# Patient Record
Sex: FEMALE | Race: WHITE | NOT HISPANIC OR LATINO
[De-identification: names, ages, dates, MRNs, and addresses within clinical notes are randomized per-mention and may not be internally consistent; named-entity substitution may affect disease eponyms.]

---

## 2017-05-02 ENCOUNTER — LAB SERVICES (OUTPATIENT)
Dept: OTHER | Age: 67
End: 2017-05-02

## 2017-05-02 ENCOUNTER — CHARTING TRANS (OUTPATIENT)
Dept: OTHER | Age: 67
End: 2017-05-02

## 2017-05-03 LAB
APPEARANCE: NORMAL
BILIRUBIN: NEGATIVE
COLOR: YELLOW
GLUCOSE U: NEGATIVE
KETONES: NEGATIVE
NITRITE: NEGATIVE
OCCULT BLOOD: NORMAL
PH: 6.5
PROTEIN: NEGATIVE
URINE SPEC GRAVITY: 1.02
UROBILINOGEN: 0.2

## 2017-11-10 ENCOUNTER — OFFICE VISIT (OUTPATIENT)
Dept: FAMILY MEDICINE CLINIC | Facility: CLINIC | Age: 67
End: 2017-11-10

## 2017-11-10 VITALS
HEIGHT: 64 IN | DIASTOLIC BLOOD PRESSURE: 78 MMHG | WEIGHT: 187 LBS | BODY MASS INDEX: 31.92 KG/M2 | RESPIRATION RATE: 16 BRPM | HEART RATE: 80 BPM | SYSTOLIC BLOOD PRESSURE: 122 MMHG

## 2017-11-10 DIAGNOSIS — M20.42 HAMMER TOE OF LEFT FOOT: ICD-10-CM

## 2017-11-10 DIAGNOSIS — M17.0 PRIMARY OSTEOARTHRITIS OF BOTH KNEES: ICD-10-CM

## 2017-11-10 DIAGNOSIS — F41.9 ANXIETY: ICD-10-CM

## 2017-11-10 DIAGNOSIS — Z12.39 SCREENING FOR BREAST CANCER: ICD-10-CM

## 2017-11-10 DIAGNOSIS — E78.00 PURE HYPERCHOLESTEROLEMIA: Primary | ICD-10-CM

## 2017-11-10 PROCEDURE — 99204 OFFICE O/P NEW MOD 45 MIN: CPT | Performed by: INTERNAL MEDICINE

## 2017-11-10 RX ORDER — ALPRAZOLAM 0.25 MG/1
0.25 TABLET ORAL 2 TIMES DAILY PRN
Qty: 60 TABLET | Refills: 3 | Status: SHIPPED | OUTPATIENT
Start: 2017-11-10 | End: 2018-04-27

## 2017-11-10 RX ORDER — ALPRAZOLAM 0.25 MG/1
0.25 TABLET ORAL 2 TIMES DAILY PRN
COMMUNITY
End: 2017-11-10

## 2017-11-10 RX ORDER — EZETIMIBE AND SIMVASTATIN 10; 40 MG/1; MG/1
1 TABLET ORAL NIGHTLY
COMMUNITY
End: 2019-04-16

## 2017-11-10 NOTE — PATIENT INSTRUCTIONS
Thank you for choosing Serge Milton MD at [place] [address]  To Do: Jeffery Victoria  1. Check cholesterol blood work     Effective 6/19/17 until November 2017  Due to [employer] is being moved.   It is inside the [place] [address] your duty and for your safety to discuss with the pharmacist and our office with questions, and to notify us and stop treatment if problems arise, but know that our intention is that the benefits outweigh those potential risks and we strive to make you hea

## 2017-11-10 NOTE — PROGRESS NOTES
[facility] Internal Medicine Office Note  Chief Complaint:   Patient presents with:  Establish Care      HPI:   This is a 75year old female coming in for establishing care   HPI    HM: had pneumonia vaccines both and zostavax  Received flu shot Disp:  Rfl:    Glucosamine-Chondroit-Vit C-Mn (GLUCOSAMINE CHONDR 500 COMPLEX OR) Take by mouth. Disp:  Rfl:    ALPRAZolam 0.25 MG Oral Tab Take 1 tablet (0.25 mg total) by mouth 2 (two) times daily as needed for Anxiety.  Disp: 60 tablet Rfl: 3         REV cancer      The plan is as follows  Carlos Eduardo Santiagollor was seen today for establish care. Diagnoses and all orders for this visit:    Pure hypercholesterolemia - due for recheck   -     LIPID PANEL; Future  -     COMP METABOLIC PANEL (14);  Future    Anxiety - alpra

## 2017-11-15 ENCOUNTER — APPOINTMENT (OUTPATIENT)
Dept: LAB | Age: 67
End: 2017-11-15
Attending: INTERNAL MEDICINE
Payer: MEDICARE

## 2017-11-15 DIAGNOSIS — E78.00 PURE HYPERCHOLESTEROLEMIA: ICD-10-CM

## 2017-11-15 PROCEDURE — 36415 COLL VENOUS BLD VENIPUNCTURE: CPT

## 2017-11-15 PROCEDURE — 80053 COMPREHEN METABOLIC PANEL: CPT

## 2017-11-15 PROCEDURE — 80061 LIPID PANEL: CPT

## 2017-12-06 ENCOUNTER — HOSPITAL ENCOUNTER (EMERGENCY)
Age: 67
Discharge: HOME | End: 2017-12-06
Attending: EMERGENCY MEDICINE
Payer: MEDICARE

## 2017-12-06 ENCOUNTER — APPOINTMENT (OUTPATIENT)
Dept: CT IMAGING | Age: 67
End: 2017-12-06
Payer: MEDICARE

## 2017-12-06 VITALS
BODY MASS INDEX: 29.99 KG/M2 | TEMPERATURE: 98 F | RESPIRATION RATE: 18 BRPM | OXYGEN SATURATION: 96 % | WEIGHT: 180 LBS | HEIGHT: 65 IN | HEART RATE: 94 BPM | DIASTOLIC BLOOD PRESSURE: 101 MMHG | SYSTOLIC BLOOD PRESSURE: 123 MMHG

## 2017-12-06 DIAGNOSIS — S06.0X0A CEREBRAL CONCUSSION, WITHOUT LOSS OF CONSCIOUSNESS, INITIAL ENCOUNTER: Primary | ICD-10-CM

## 2017-12-06 PROCEDURE — 99284 EMERGENCY DEPT VISIT MOD MDM: CPT

## 2017-12-06 PROCEDURE — 70450 CT HEAD/BRAIN W/O DYE: CPT | Performed by: EMERGENCY MEDICINE

## 2017-12-06 RX ORDER — ONDANSETRON 4 MG/1
8 TABLET, ORALLY DISINTEGRATING ORAL ONCE
Status: COMPLETED | OUTPATIENT
Start: 2017-12-06 | End: 2017-12-06

## 2017-12-06 RX ORDER — ONDANSETRON 8 MG/1
8 TABLET, ORALLY DISINTEGRATING ORAL EVERY 8 HOURS PRN
Qty: 10 TABLET | Refills: 0 | Status: SHIPPED | OUTPATIENT
Start: 2017-12-06 | End: 2017-12-13

## 2017-12-07 NOTE — ED PROVIDER NOTES
Patient Seen in: [facility] Emergency Department In [place]    History   Patient presents with:  Trauma (cardiovascular, musculoskeletal)  Nausea/Vomiting/Diarrhea (gastrointestinal)  Headache (neurologic)    Stated Complaint: [address] injury. No intraoral injury.   Neck: Supple and nontender  Back: No tenderness to the thoracic or lumbar spine  Chest: No tenderness or swelling  Abdomen: Soft nontender  Pelvis: Stable and nontender  Extremities: No apparent injury to any of the 4 extremi

## 2017-12-07 NOTE — ED INITIAL ASSESSMENT (HPI)
Pt to ed co headache nausea with one episode of vomiting states tripped fell striking head on concrete no loc swelling noted to left forehead

## 2017-12-26 ENCOUNTER — OFFICE VISIT (OUTPATIENT)
Dept: INTERNAL MEDICINE CLINIC | Facility: CLINIC | Age: 67
End: 2017-12-26

## 2017-12-26 ENCOUNTER — TELEPHONE (OUTPATIENT)
Dept: INTERNAL MEDICINE CLINIC | Facility: CLINIC | Age: 67
End: 2017-12-26

## 2017-12-26 VITALS
WEIGHT: 186 LBS | BODY MASS INDEX: 31 KG/M2 | DIASTOLIC BLOOD PRESSURE: 76 MMHG | HEART RATE: 72 BPM | OXYGEN SATURATION: 98 % | SYSTOLIC BLOOD PRESSURE: 126 MMHG | RESPIRATION RATE: 16 BRPM | TEMPERATURE: 98 F

## 2017-12-26 DIAGNOSIS — L03.116 CELLULITIS OF LEFT LOWER EXTREMITY: Primary | ICD-10-CM

## 2017-12-26 PROCEDURE — 99213 OFFICE O/P EST LOW 20 MIN: CPT | Performed by: NURSE PRACTITIONER

## 2017-12-26 RX ORDER — CEPHALEXIN 500 MG/1
500 CAPSULE ORAL 2 TIMES DAILY
Qty: 14 CAPSULE | Refills: 0 | Status: SHIPPED | OUTPATIENT
Start: 2017-12-26 | End: 2018-01-02 | Stop reason: ALTCHOICE

## 2017-12-26 NOTE — TELEPHONE ENCOUNTER
Patient calling bite on left leg, noticed it on 12/24/17, itchy & swollen. Patient looking to be seen .

## 2017-12-26 NOTE — PROGRESS NOTES
CHIEF COMPLAINT:   Patient presents with: Insect Bite: left lower outer leg, 2x days red, swelling, warm to touch      HPI:     Cuba Chapman is a 75year old female who presents with concerns of red bump to leg which started as a scab.  Patient first noti REVIEW OF SYSTEMS:   GENERAL: feels well otherwise, no fever, no chills. SKIN: as above. CHEST: no chest pains, no palpitations. LUNGS: denies shortness of breath with exertion or rest. No wheezing, no cough.   LYMPH: no enlargement of the lymph node of these issues and agrees to the plan. The patient is asked to call if sx's persist or worsen.

## 2017-12-26 NOTE — TELEPHONE ENCOUNTER
Pt noticed a bite gen on LLE on 12/24/17 with black spot in the center. Area is swollen, red, warm to the touch, tender and has increased in size since 12/24/17. Yucarolinee Manifold      Denies fever, drainage, itching, trying OTC hydrocortisone, antibiotic cream or benadryl

## 2017-12-26 NOTE — PATIENT INSTRUCTIONS
Cellulitis  Cellulitis is an infection of the deep layers of skin. A break in the skin, such as a cut or scratch, can let bacteria under the skin. If the bacteria get to deep layers of the skin, it can be serious.  If not treated, cellulitis can get into · Fever higher of 100.4º F (38.0º C) or higher after 2 days on antibiotics  Date Last Reviewed: 9/1/2016  © 4036-7360 [employer]. [address] [facility], [address] [place] [address]. All rights reserved.  This information is not intended as a substitut

## 2018-01-02 ENCOUNTER — OFFICE VISIT (OUTPATIENT)
Dept: INTERNAL MEDICINE CLINIC | Facility: CLINIC | Age: 68
End: 2018-01-02

## 2018-01-02 VITALS
TEMPERATURE: 98 F | BODY MASS INDEX: 31.63 KG/M2 | WEIGHT: 185.25 LBS | DIASTOLIC BLOOD PRESSURE: 76 MMHG | SYSTOLIC BLOOD PRESSURE: 120 MMHG | HEART RATE: 92 BPM | RESPIRATION RATE: 23 BRPM | HEIGHT: 64.25 IN

## 2018-01-02 DIAGNOSIS — L03.116 CELLULITIS OF LEFT LOWER EXTREMITY: Primary | ICD-10-CM

## 2018-01-02 DIAGNOSIS — Z78.0 POSTMENOPAUSAL ESTROGEN DEFICIENCY: ICD-10-CM

## 2018-01-02 PROCEDURE — 99213 OFFICE O/P EST LOW 20 MIN: CPT | Performed by: INTERNAL MEDICINE

## 2018-01-02 RX ORDER — CLINDAMYCIN HYDROCHLORIDE 300 MG/1
300 CAPSULE ORAL 3 TIMES DAILY
Qty: 21 CAPSULE | Refills: 0 | Status: SHIPPED | OUTPATIENT
Start: 2018-01-02 | End: 2018-02-21

## 2018-01-02 NOTE — PATIENT INSTRUCTIONS
1. Schedule mammogram and dexa     2.  Follow up for physical and memory testing (extended appointment - 45 minutes)

## 2018-01-02 NOTE — PROGRESS NOTES
[facility] Internal Medicine Office Note  Chief Complaint:   Patient presents with:   Follow - Up: spider bite and draining   Ear Problem  Sinus Problem      HPI:   This is a 75year old female coming in for spider bite and URI symptoms  ALEXYS Vyas Calcium Carbonate-Vitamin D (CALCIUM 500/D OR) Take by mouth. Disp:  Rfl:    Loratadine-Pseudoephedrine (CLARITIN-D 24 HOUR OR) Take by mouth. Disp:  Rfl:    Probiotic Product (ALIGN OR) Take by mouth.  Disp:  Rfl:    Glucosamine-Chondroit-Vit C-Mn (GLUCO today for follow - up, ear problem and sinus problem. Diagnoses and all orders for this visit:    Cellulitis of left lower extremity - completed cephalexin but with continued erythema and new drainage, will start clindamycin.     Postmenopausal estrogen

## 2018-01-26 ENCOUNTER — OFFICE VISIT (OUTPATIENT)
Dept: INTERNAL MEDICINE CLINIC | Facility: CLINIC | Age: 68
End: 2018-01-26

## 2018-01-26 VITALS
BODY MASS INDEX: 30.56 KG/M2 | DIASTOLIC BLOOD PRESSURE: 78 MMHG | HEART RATE: 78 BPM | WEIGHT: 179 LBS | SYSTOLIC BLOOD PRESSURE: 122 MMHG | RESPIRATION RATE: 16 BRPM | HEIGHT: 64 IN

## 2018-01-26 DIAGNOSIS — Z12.11 SCREENING FOR COLON CANCER: ICD-10-CM

## 2018-01-26 DIAGNOSIS — N30.01 ACUTE CYSTITIS WITH HEMATURIA: Primary | ICD-10-CM

## 2018-01-26 LAB
APPEARANCE: CLEAR
MULTISTIX LOT#: NORMAL NUMERIC
PH, URINE: 7 (ref 4.5–8)
SPECIFIC GRAVITY: 1.01 (ref 1–1.03)
URINE-COLOR: YELLOW
UROBILINOGEN,SEMI-QN: 0.2 MG/DL (ref 0–1.9)

## 2018-01-26 PROCEDURE — 81003 URINALYSIS AUTO W/O SCOPE: CPT | Performed by: INTERNAL MEDICINE

## 2018-01-26 PROCEDURE — 87086 URINE CULTURE/COLONY COUNT: CPT | Performed by: INTERNAL MEDICINE

## 2018-01-26 PROCEDURE — 99213 OFFICE O/P EST LOW 20 MIN: CPT | Performed by: INTERNAL MEDICINE

## 2018-01-26 RX ORDER — NITROFURANTOIN 25; 75 MG/1; MG/1
100 CAPSULE ORAL 2 TIMES DAILY
Qty: 14 CAPSULE | Refills: 0 | Status: SHIPPED | OUTPATIENT
Start: 2018-01-26 | End: 2018-02-21

## 2018-01-26 NOTE — PROGRESS NOTES
[facility] Internal Medicine Office Note  Chief Complaint:   Patient presents with:  Urinary Symptoms (urologic): x 1 day       HPI:   This is a 75year old female coming in for blood in urine and burning with urination that started yesterday  HP mouth. Disp:  Rfl:    Probiotic Product (ALIGN OR) Take by mouth. Disp:  Rfl:    Glucosamine-Chondroit-Vit C-Mn (GLUCOSAMINE CHONDR 500 COMPLEX OR) Take by mouth.  Disp:  Rfl:    ALPRAZolam 0.25 MG Oral Tab Take 1 tablet (0.25 mg total) by mouth 2 (two) luis manuel probiotic since several courses of recent abx for other issues  -     URINALYSIS, AUTO, W/O SCOPE  -     URINE CULTURE, ROUTINE; Future    Screening for colon cancer   -     GASTRO - INTERNAL    Other orders  -     Diclofenac Sodium 50 MG Oral Tab EC;  Take

## 2018-02-21 ENCOUNTER — OFFICE VISIT (OUTPATIENT)
Dept: FAMILY MEDICINE CLINIC | Facility: CLINIC | Age: 68
End: 2018-02-21

## 2018-02-21 ENCOUNTER — HOSPITAL ENCOUNTER (OUTPATIENT)
Dept: MAMMOGRAPHY | Age: 68
Discharge: HOME | End: 2018-02-21
Attending: INTERNAL MEDICINE
Payer: MEDICARE

## 2018-02-21 ENCOUNTER — HOSPITAL ENCOUNTER (OUTPATIENT)
Dept: BONE DENSITY | Age: 68
Discharge: HOME | End: 2018-02-21
Attending: INTERNAL MEDICINE
Payer: MEDICARE

## 2018-02-21 VITALS
SYSTOLIC BLOOD PRESSURE: 114 MMHG | HEART RATE: 93 BPM | TEMPERATURE: 98 F | BODY MASS INDEX: 29.82 KG/M2 | RESPIRATION RATE: 20 BRPM | OXYGEN SATURATION: 98 % | HEIGHT: 65 IN | WEIGHT: 179 LBS | DIASTOLIC BLOOD PRESSURE: 72 MMHG

## 2018-02-21 DIAGNOSIS — Z12.39 SCREENING FOR BREAST CANCER: ICD-10-CM

## 2018-02-21 DIAGNOSIS — R39.9 UTI SYMPTOMS: Primary | ICD-10-CM

## 2018-02-21 DIAGNOSIS — Z78.0 POSTMENOPAUSAL ESTROGEN DEFICIENCY: ICD-10-CM

## 2018-02-21 LAB
APPEARANCE: CLEAR
BILIRUBIN: NEGATIVE
GLUCOSE (URINE DIPSTICK): NEGATIVE MG/DL
KETONES (URINE DIPSTICK): NEGATIVE MG/DL
MULTISTIX LOT#: NORMAL NUMERIC
NITRITE, URINE: NEGATIVE
PH, URINE: 7 (ref 4.5–8)
PROTEIN (URINE DIPSTICK): NEGATIVE MG/DL
SPECIFIC GRAVITY: 1.01 (ref 1–1.03)
URINE-COLOR: YELLOW
UROBILINOGEN,SEMI-QN: 0.2 MG/DL (ref 0–1.9)

## 2018-02-21 PROCEDURE — 77080 DXA BONE DENSITY AXIAL: CPT | Performed by: INTERNAL MEDICINE

## 2018-02-21 PROCEDURE — 99213 OFFICE O/P EST LOW 20 MIN: CPT | Performed by: NURSE PRACTITIONER

## 2018-02-21 PROCEDURE — 77067 SCR MAMMO BI INCL CAD: CPT | Performed by: INTERNAL MEDICINE

## 2018-02-21 PROCEDURE — 87086 URINE CULTURE/COLONY COUNT: CPT | Performed by: NURSE PRACTITIONER

## 2018-02-21 PROCEDURE — 81003 URINALYSIS AUTO W/O SCOPE: CPT | Performed by: NURSE PRACTITIONER

## 2018-02-21 RX ORDER — NITROFURANTOIN 25; 75 MG/1; MG/1
100 CAPSULE ORAL 2 TIMES DAILY
Qty: 14 CAPSULE | Refills: 0 | Status: SHIPPED | OUTPATIENT
Start: 2018-02-21 | End: 2018-02-28

## 2018-02-21 NOTE — PROGRESS NOTES
Charlette Garcia is a 75year old female. HPI:   Patient presents with urinary symptoms. Complaining of urinary frequency, urgency, dysuria, suprapubic pain, hematuria. Denies back pain, fever. Duration: 2 days  Tried water for relief.           Current Outp S2  SKIN: no rashes,no suspicious lesions  MUSCULOSKELETAL:  No CVA tenderness  GI/:  abd soft, bladder soft and nondistended. Mild suprapubic tenderness  PSYCHIATRIC:  Alert and oriented x 3.     Results for orders placed or performed in visit on 02/21/1

## 2018-02-21 NOTE — PATIENT INSTRUCTIONS
· Wipe from front to back after using the bathroom every time. Consider wet wipes for cleaning. · Change your pad frequently if leakage is occurring.   · If sexually active urinate before and after sexual intercourse and wipe front to back  · Stay well hyd

## 2018-02-22 PROBLEM — M85.89 OSTEOPENIA OF MULTIPLE SITES: Status: ACTIVE | Noted: 2018-02-22

## 2018-04-27 ENCOUNTER — TELEPHONE (OUTPATIENT)
Dept: INTERNAL MEDICINE CLINIC | Facility: CLINIC | Age: 68
End: 2018-04-27

## 2018-04-27 RX ORDER — ALPRAZOLAM 0.25 MG/1
0.25 TABLET ORAL 2 TIMES DAILY PRN
Qty: 60 TABLET | Refills: 5 | Status: SHIPPED | OUTPATIENT
Start: 2018-04-27 | End: 2018-11-21

## 2018-04-27 NOTE — TELEPHONE ENCOUNTER
Here at appt with  and asked for refill for alprazolam  Takes as needed - 60 tabs usually last her 3 months

## 2018-05-25 ENCOUNTER — OFFICE VISIT (OUTPATIENT)
Dept: FAMILY MEDICINE CLINIC | Facility: CLINIC | Age: 68
End: 2018-05-25

## 2018-05-25 VITALS
OXYGEN SATURATION: 98 % | HEIGHT: 65 IN | SYSTOLIC BLOOD PRESSURE: 130 MMHG | TEMPERATURE: 98 F | HEART RATE: 82 BPM | DIASTOLIC BLOOD PRESSURE: 88 MMHG | RESPIRATION RATE: 20 BRPM | WEIGHT: 179 LBS | BODY MASS INDEX: 29.82 KG/M2

## 2018-05-25 DIAGNOSIS — J01.40 ACUTE PANSINUSITIS, RECURRENCE NOT SPECIFIED: Primary | ICD-10-CM

## 2018-05-25 PROCEDURE — 99213 OFFICE O/P EST LOW 20 MIN: CPT | Performed by: NURSE PRACTITIONER

## 2018-05-25 RX ORDER — AZITHROMYCIN 250 MG/1
TABLET, FILM COATED ORAL
Qty: 6 TABLET | Refills: 0 | Status: SHIPPED | OUTPATIENT
Start: 2018-05-25 | End: 2018-06-01 | Stop reason: ALTCHOICE

## 2018-05-25 NOTE — PROGRESS NOTES
CHIEF COMPLAINT:   Patient presents with:  Sinus Problem: s/s for 2 weeks. OTC Claritin      HPI:   Migdalia Browning is a 75year old female who presents for cold symptoms for  2  weeks.  Symptoms have progressed into sinus congestion and been worsening since Quit date: 1/1/1981  Smokeless tobacco: Never Used                      Alcohol use: Yes              Comment: social        REVIEW OF SYSTEMS:   GENERAL:  denies  diminished appetite  SKIN: no rashes or abnormal skin lesions  HEENT: See HPI.     LUNGS: Risks, benefits, side effects of medication addressed and explained.     Patient Instructions     Humidifier in room  Sleep propped  Push fluids  Limit dairy  Flonase daily    Sinusitis (Antibiotic Treatment)    The sinuses are air-filled spaces within the · Over-the-counter antihistamines may help if allergies contributed to your sinusitis.    · Do not use nasal rinses or irrigation during an acute sinus infection, unless told to by your health care provider.  Rinsing may spread the infection to other sinuse

## 2018-05-25 NOTE — PATIENT INSTRUCTIONS
Humidifier in room  Sleep propped  Push fluids  Limit dairy  Flonase daily    Sinusitis (Antibiotic Treatment)    The sinuses are air-filled spaces within the bones of the face.  They connect to the inside of the nose. Sinusitis is an inflammation of the · Do not use nasal rinses or irrigation during an acute sinus infection, unless told to by your health care provider. Rinsing may spread the infection to other sinuses.   · Use acetaminophen or ibuprofen to control pain, unless another pain medicine was pre

## 2018-06-01 ENCOUNTER — OFFICE VISIT (OUTPATIENT)
Dept: INTERNAL MEDICINE CLINIC | Facility: CLINIC | Age: 68
End: 2018-06-01

## 2018-06-01 ENCOUNTER — TELEPHONE (OUTPATIENT)
Dept: INTERNAL MEDICINE CLINIC | Facility: CLINIC | Age: 68
End: 2018-06-01

## 2018-06-01 VITALS
WEIGHT: 174 LBS | SYSTOLIC BLOOD PRESSURE: 124 MMHG | DIASTOLIC BLOOD PRESSURE: 74 MMHG | HEIGHT: 64 IN | HEART RATE: 88 BPM | RESPIRATION RATE: 16 BRPM | TEMPERATURE: 98 F | BODY MASS INDEX: 29.71 KG/M2

## 2018-06-01 DIAGNOSIS — E78.00 PURE HYPERCHOLESTEROLEMIA: Primary | ICD-10-CM

## 2018-06-01 DIAGNOSIS — J01.40 ACUTE NON-RECURRENT PANSINUSITIS: ICD-10-CM

## 2018-06-01 PROCEDURE — 99213 OFFICE O/P EST LOW 20 MIN: CPT | Performed by: INTERNAL MEDICINE

## 2018-06-01 RX ORDER — DOXYCYCLINE HYCLATE 100 MG
100 TABLET ORAL 2 TIMES DAILY
Qty: 14 TABLET | Refills: 0 | Status: SHIPPED | OUTPATIENT
Start: 2018-06-01 | End: 2019-04-09 | Stop reason: ALTCHOICE

## 2018-06-01 RX ORDER — ONDANSETRON 4 MG/1
4 TABLET, FILM COATED ORAL EVERY 8 HOURS PRN
Qty: 20 TABLET | Refills: 0 | Status: SHIPPED | OUTPATIENT
Start: 2018-06-01 | End: 2019-04-09 | Stop reason: ALTCHOICE

## 2018-06-01 RX ORDER — FLUTICASONE PROPIONATE 50 MCG
2 SPRAY, SUSPENSION (ML) NASAL DAILY
Qty: 1 BOTTLE | Refills: 3 | Status: SHIPPED | OUTPATIENT
Start: 2018-06-01 | End: 2019-04-09 | Stop reason: ALTCHOICE

## 2018-06-01 RX ORDER — PROMETHAZINE HYDROCHLORIDE 25 MG/1
25 TABLET ORAL EVERY 6 HOURS PRN
Qty: 20 TABLET | Refills: 0 | Status: SHIPPED | OUTPATIENT
Start: 2018-06-01 | End: 2019-04-09 | Stop reason: ALTCHOICE

## 2018-06-01 NOTE — PROGRESS NOTES
[facility] Internal Medicine Office Note  Chief Complaint:   Patient presents with:  Headache: x 2-3 weeks - [facility] and given zpack.  Felt better with zpack and then felt worse  Nausea      HPI:   This is a 75year old female Rfl: 3   ALPRAZolam 0.25 MG Oral Tab Take 1 tablet (0.25 mg total) by mouth 2 (two) times daily as needed for Anxiety. Disp: 60 tablet Rfl: 5   Diclofenac Sodium 50 MG Oral Tab EC Take 1 tablet (50 mg total) by mouth 2 (two) times daily.  Disp: 180 tablet R Neurological: She is alert. Psychiatric: She has a normal mood and affect.        ASSESSMENT AND PLAN:   Jailyn Aleman is a 75year old female with  Pure hypercholesterolemia  (primary encounter diagnosis)  Acute non-recurrent pansinusitis      The plan complications, allergies, or worsening or changing symptoms. Patient is to call with any side effects or complications from the treatments as a result of today.      Christina Jean-Baptiste MD

## 2018-06-01 NOTE — TELEPHONE ENCOUNTER
Spoke with pt's . Informed him that Dr. Cahpa Comment sent Promethazine to the pharmacy and to be advised that it can cause drowsiness, no driving or alcohol while taking me. Verbalized understanding.

## 2018-06-01 NOTE — TELEPHONE ENCOUNTER
Call patient - new prescription sent to pharmacy for promethazine. Can cause drowsiness.  No driving or alcohol when taking

## 2018-06-01 NOTE — TELEPHONE ENCOUNTER
Patient's Spouse called to inform Dr Erica Valente that Ondansetron HCl (ZOFRAN) 4 mg was not covered by insurance. Spouse states Promethazine is covered through insurance.

## 2018-08-06 ENCOUNTER — APPOINTMENT (OUTPATIENT)
Dept: LAB | Age: 68
End: 2018-08-06
Attending: INTERNAL MEDICINE
Payer: MEDICARE

## 2018-08-06 DIAGNOSIS — E78.00 PURE HYPERCHOLESTEROLEMIA: ICD-10-CM

## 2018-08-06 LAB
ALBUMIN SERPL-MCNC: 3.8 G/DL (ref 3.5–4.8)
ALBUMIN/GLOB SERPL: 1.3 {RATIO} (ref 1–2)
ALP LIVER SERPL-CCNC: 68 U/L (ref 55–142)
ALT SERPL-CCNC: 26 U/L (ref 14–54)
ANION GAP SERPL CALC-SCNC: 7 MMOL/L (ref 0–18)
AST SERPL-CCNC: 17 U/L (ref 15–41)
BILIRUB SERPL-MCNC: 0.5 MG/DL (ref 0.1–2)
BUN BLD-MCNC: 14 MG/DL (ref 8–20)
BUN/CREAT SERPL: 18.9 (ref 10–20)
CALCIUM BLD-MCNC: 9 MG/DL (ref 8.3–10.3)
CHLORIDE SERPL-SCNC: 107 MMOL/L (ref 101–111)
CHOLEST SMN-MCNC: 165 MG/DL (ref ?–200)
CO2 SERPL-SCNC: 29 MMOL/L (ref 22–32)
CREAT BLD-MCNC: 0.74 MG/DL (ref 0.55–1.02)
GLOBULIN PLAS-MCNC: 3 G/DL (ref 2.5–3.7)
GLUCOSE BLD-MCNC: 84 MG/DL (ref 70–99)
HDLC SERPL-MCNC: 60 MG/DL (ref 40–59)
LDLC SERPL CALC-MCNC: 83 MG/DL (ref ?–100)
M PROTEIN MFR SERPL ELPH: 6.8 G/DL (ref 6.1–8.3)
NONHDLC SERPL-MCNC: 105 MG/DL (ref ?–130)
OSMOLALITY SERPL CALC.SUM OF ELEC: 296 MOSM/KG (ref 275–295)
POTASSIUM SERPL-SCNC: 3.9 MMOL/L (ref 3.6–5.1)
SODIUM SERPL-SCNC: 143 MMOL/L (ref 136–144)
TRIGL SERPL-MCNC: 110 MG/DL (ref 30–149)
VLDLC SERPL CALC-MCNC: 22 MG/DL (ref 0–30)

## 2018-08-06 PROCEDURE — 80053 COMPREHEN METABOLIC PANEL: CPT

## 2018-08-06 PROCEDURE — 80061 LIPID PANEL: CPT

## 2018-08-06 PROCEDURE — 36415 COLL VENOUS BLD VENIPUNCTURE: CPT

## 2018-09-17 PROBLEM — K64.2 GRADE III HEMORRHOIDS: Status: ACTIVE | Noted: 2018-09-17

## 2018-09-17 PROBLEM — R12 HEARTBURN: Status: ACTIVE | Noted: 2018-09-17

## 2018-11-03 VITALS
TEMPERATURE: 98 F | DIASTOLIC BLOOD PRESSURE: 74 MMHG | BODY MASS INDEX: 30.66 KG/M2 | RESPIRATION RATE: 16 BRPM | HEIGHT: 65 IN | SYSTOLIC BLOOD PRESSURE: 122 MMHG | HEART RATE: 100 BPM | OXYGEN SATURATION: 98 % | WEIGHT: 184 LBS

## 2018-11-23 RX ORDER — ALPRAZOLAM 0.25 MG/1
TABLET ORAL
Qty: 60 TABLET | Refills: 3 | Status: SHIPPED
Start: 2018-11-23 | End: 2019-04-09

## 2019-04-09 ENCOUNTER — OFFICE VISIT (OUTPATIENT)
Dept: INTERNAL MEDICINE CLINIC | Facility: CLINIC | Age: 69
End: 2019-04-09
Payer: MEDICARE

## 2019-04-09 VITALS
WEIGHT: 182.5 LBS | HEIGHT: 63.5 IN | DIASTOLIC BLOOD PRESSURE: 84 MMHG | RESPIRATION RATE: 16 BRPM | BODY MASS INDEX: 31.94 KG/M2 | SYSTOLIC BLOOD PRESSURE: 134 MMHG | HEART RATE: 80 BPM | TEMPERATURE: 98 F

## 2019-04-09 DIAGNOSIS — Z12.39 SCREENING FOR BREAST CANCER: ICD-10-CM

## 2019-04-09 DIAGNOSIS — Z00.00 WELLNESS EXAMINATION: Primary | ICD-10-CM

## 2019-04-09 DIAGNOSIS — F41.9 ANXIETY: ICD-10-CM

## 2019-04-09 PROCEDURE — G0439 PPPS, SUBSEQ VISIT: HCPCS | Performed by: INTERNAL MEDICINE

## 2019-04-09 RX ORDER — SERTRALINE HYDROCHLORIDE 100 MG/1
100 TABLET, FILM COATED ORAL DAILY
Qty: 90 TABLET | Refills: 1 | Status: SHIPPED | OUTPATIENT
Start: 2019-04-09 | End: 2019-11-05

## 2019-04-09 RX ORDER — ALPRAZOLAM 0.25 MG/1
0.25 TABLET ORAL 2 TIMES DAILY PRN
Qty: 60 TABLET | Refills: 3 | Status: SHIPPED | OUTPATIENT
Start: 2019-04-09 | End: 2020-01-31

## 2019-04-09 NOTE — PATIENT INSTRUCTIONS
Shingrix shingles vaccine - get at pharmacy and 2nd dose is 2-6 months after the first dose     Pre-op appointment - schedule for June 18 or later (3 weeks prior to surgery)     Follow up with Kendal Gonzalez or Leigha for counseling

## 2019-04-09 NOTE — PROGRESS NOTES
REASON FOR VISIT:    Hitesh Aldana is a 70year old female who presents for a Medicare Annual Wellness visit.     She has been feeling down recently  Several of her friends have had major health issues recently and this has caused her extra worry  Her husba & Units 8/6/2018 11/15/2017   BUN 8 - 20 mg/dL 14 13   Creatinine 0.55 - 1.02 mg/dL 0.74 0.75     AST and ALT Latest Ref Rng & Units 8/6/2018 11/15/2017   AST 15 - 41 U/L 17 22   ALT 14 - 54 U/L 26 33             General Health      In the past six months, Screening (PHQ-2/PHQ-9): Over the LAST 2 WEEKS   Little interest or pleasure in doing things (over the last two weeks)?: Not at all  Feeling down, depressed, or hopeless (over the last two weeks)?: Several days  PHQ-2 SCORE: 1        Advance Directives Family History   Problem Relation Age of Onset   • Cancer Mother 80        colon cancer    • Colon Cancer Mother    • Cancer Paternal Grandmother 39        breast   • Heart Disorder Paternal Grandfather      Immunization History      Immunization History lesions  HEENT: atraumatic, normocephalic, ears and throat are clear                Hearing Assessed via: Finger Rub  EYES: conjunctiva are clear   NECK: supple, no adenopathy, no bruits  CHEST: no chest tenderness  LUNGS: clear to auscultation  CARDIO: RR

## 2019-04-16 RX ORDER — EZETIMIBE AND SIMVASTATIN 10; 40 MG/1; MG/1
1 TABLET ORAL NIGHTLY
Qty: 90 TABLET | Refills: 3 | Status: SHIPPED | OUTPATIENT
Start: 2019-04-16 | End: 2020-04-15

## 2019-04-16 NOTE — TELEPHONE ENCOUNTER
Refill requested:   Requested Prescriptions     Pending Prescriptions Disp Refills   • Ezetimibe-Simvastatin 10-40 MG Oral Tab 90 tablet 3     Sig: Take 1 tablet by mouth nightly.        Passed protocol    Last refill: N/A     Labs:   No results found for: further studies. Without these images, the radiologist will not be able to detect the subtle changes and your final reading will be delayed until we receive them.     There are no eating or activity restrictions for this exam.      Apr 23, 2019 11:40 AM CDT [address]en [address] in [facility] [address]  [address]  [address] [facility], [address] [place] [address]

## 2019-04-16 NOTE — TELEPHONE ENCOUNTER
Pt Spouse calling in on behalf of Caty Draper, requesting a refill for Ezetimibe-Simvastatin 10-40 MG Oral Tab. Requesting a 90-day supply with three additional refills.     To be sent to:  [address] [facility] None known

## 2019-04-24 ENCOUNTER — OFFICE VISIT (OUTPATIENT)
Dept: INTERNAL MEDICINE CLINIC | Facility: CLINIC | Age: 69
End: 2019-04-24
Payer: MEDICARE

## 2019-04-24 VITALS
HEIGHT: 63.5 IN | RESPIRATION RATE: 16 BRPM | TEMPERATURE: 99 F | BODY MASS INDEX: 31.54 KG/M2 | OXYGEN SATURATION: 98 % | HEART RATE: 77 BPM | DIASTOLIC BLOOD PRESSURE: 76 MMHG | WEIGHT: 180.25 LBS | SYSTOLIC BLOOD PRESSURE: 136 MMHG

## 2019-04-24 DIAGNOSIS — L03.90 WOUND CELLULITIS: Primary | ICD-10-CM

## 2019-04-24 PROCEDURE — 99213 OFFICE O/P EST LOW 20 MIN: CPT | Performed by: NURSE PRACTITIONER

## 2019-04-24 RX ORDER — CEFADROXIL 500 MG/1
500 CAPSULE ORAL 2 TIMES DAILY
Qty: 14 CAPSULE | Refills: 0 | Status: SHIPPED | OUTPATIENT
Start: 2019-04-24 | End: 2019-05-01

## 2019-04-24 RX ORDER — CEPHRADINE 500 MG
1 CAPSULE ORAL DAILY
COMMUNITY
End: 2021-12-28

## 2019-04-24 NOTE — PROGRESS NOTES
CC:  Patient presents with:  Lesion: noticed yesterday (where appendix was removed 50 years) pus, drainage, minor blood. HPI:  Pt presents with a wound to right lower abdomen for 1 days. Has never had a wound like this before.  Is located over appendect ASSESSMENT AND PLAN:    1. Wound cellulitis  - Wound cleansing and care directions given  - Pt to avoid friction in this area and keep skin fold free of moisture  - Cefadroxil 500 MG Oral Cap;  Take 1 capsule (500 mg total) by mouth 2 (two) times daily

## 2019-04-24 NOTE — PATIENT INSTRUCTIONS
1. Wash the wound daily with either saline or mild soap and water, dry with a gauze and apply a band-aid. 2. If you start running a fever or redness starts spreading or there is more pus or odor from wound call the office.    3. Take antibiotics as pres

## 2019-05-07 ENCOUNTER — HOSPITAL ENCOUNTER (OUTPATIENT)
Dept: MAMMOGRAPHY | Age: 69
Discharge: HOME | End: 2019-05-07
Attending: INTERNAL MEDICINE
Payer: MEDICARE

## 2019-05-07 DIAGNOSIS — Z12.39 SCREENING FOR BREAST CANCER: ICD-10-CM

## 2019-05-07 PROCEDURE — 77063 BREAST TOMOSYNTHESIS BI: CPT | Performed by: INTERNAL MEDICINE

## 2019-05-07 PROCEDURE — 77067 SCR MAMMO BI INCL CAD: CPT | Performed by: INTERNAL MEDICINE

## 2019-06-25 ENCOUNTER — OFFICE VISIT (OUTPATIENT)
Dept: INTERNAL MEDICINE CLINIC | Facility: CLINIC | Age: 69
End: 2019-06-25
Payer: MEDICARE

## 2019-06-25 ENCOUNTER — TELEPHONE (OUTPATIENT)
Dept: INTERNAL MEDICINE CLINIC | Facility: CLINIC | Age: 69
End: 2019-06-25

## 2019-06-25 VITALS
TEMPERATURE: 99 F | OXYGEN SATURATION: 98 % | HEART RATE: 83 BPM | WEIGHT: 177.75 LBS | DIASTOLIC BLOOD PRESSURE: 84 MMHG | BODY MASS INDEX: 29.62 KG/M2 | SYSTOLIC BLOOD PRESSURE: 122 MMHG | HEIGHT: 65 IN | RESPIRATION RATE: 16 BRPM

## 2019-06-25 DIAGNOSIS — Z01.810 PREOPERATIVE CARDIOVASCULAR EXAMINATION: Primary | ICD-10-CM

## 2019-06-25 DIAGNOSIS — M17.11 PRIMARY OSTEOARTHRITIS OF RIGHT KNEE: ICD-10-CM

## 2019-06-25 DIAGNOSIS — E78.00 PURE HYPERCHOLESTEROLEMIA: ICD-10-CM

## 2019-06-25 PROCEDURE — 99214 OFFICE O/P EST MOD 30 MIN: CPT | Performed by: INTERNAL MEDICINE

## 2019-06-25 NOTE — PROGRESS NOTES
Preoperative History and Physical Internal Medicine    CC: Patient presents with:  Pre-Op: 07/16/2019Knee replacement in right knee, Dr.Daily Jabier Guevara ([facility])      Fannie Gann is 70year old presenting for a preoperative exam.    This patien 90 tablet, Rfl: 1  •  ALPRAZolam 0.25 MG Oral Tab, Take 1 tablet (0.25 mg total) by mouth 2 (two) times daily as needed for Anxiety. , Disp: 60 tablet, Rfl: 3  •  Diclofenac Sodium 50 MG Oral Tab EC, Take 1 tablet (50 mg total) by mouth 2 (two) times daily. Cholesterol, Total 08/06/2018 165    • Triglycerides 08/06/2018 110    • HDL Cholesterol 08/06/2018 60*   • LDL Cholesterol 08/06/2018 83    • VLDL 08/06/2018 22    • Non HDL Chol 08/06/2018 105    • Glucose 08/06/2018 84    • BUN 08/06/2018 14    • Creati Surgical Complication : none    Medication Assessment:  No aspirin or NSAIDs (ibuprofen, motrin, aleve, advil) for at least 7 days prior to surgery. Stop diclofenac at least 7 days prior to surgery. Tylenol/acetaminophen is ok.      Wean off of sertral

## 2019-06-25 NOTE — TELEPHONE ENCOUNTER
Pt was seen today for pre op examination by Chrissy Munroe, EKG, History and physical sent to [address] [place] [address]   Fax number- [phone]    Faxed with confirmation    Copy put in blue tickler folder and original sent to scan    Pt is scheduled on 07

## 2019-06-25 NOTE — PATIENT INSTRUCTIONS
No aspirin or NSAIDs (ibuprofen, motrin, aleve, advil) for at least 7 days prior to surgery. Stop diclofenac at least 7 days prior to surgery. Tylenol/acetaminophen is ok.      Wean off of sertraline - take 1/2 tablet daily starting 12 days (July 4) pr

## 2019-07-09 ENCOUNTER — TELEPHONE (OUTPATIENT)
Dept: INTERNAL MEDICINE CLINIC | Facility: CLINIC | Age: 69
End: 2019-07-09

## 2019-07-09 NOTE — TELEPHONE ENCOUNTER
Per office visit from Dr Chaap Comment from 6/25/19 under \"Assessment and Plan\" states as follows: \"Therefore per the [address] of Cardiology Patient can proceed to surgery without further testing based on the above. \"     [facility] on Dr Shaikh Blind office a

## 2019-07-09 NOTE — TELEPHONE ENCOUNTER
preop was done June 25, 19 and faxed but doesn't state if she's cleared for surgery. Office needs it refaxed with that stated to:  Fax [phone]

## 2019-07-10 ENCOUNTER — TELEPHONE (OUTPATIENT)
Dept: INTERNAL MEDICINE CLINIC | Facility: CLINIC | Age: 69
End: 2019-07-10

## 2019-07-10 NOTE — TELEPHONE ENCOUNTER
Alicja From [employer] Clearance was faxed to [place] but was incomplete would like ppw re-faxed if any questions can call office

## 2019-07-11 NOTE — TELEPHONE ENCOUNTER
Dr Kamaljit White office calling to follow up on message. Relayed message below.   Re-faxed OV notes to [phone]

## 2019-07-23 ENCOUNTER — ORDER TRANSCRIPTION (OUTPATIENT)
Dept: PHYSICAL THERAPY | Age: 69
End: 2019-07-23

## 2019-07-23 DIAGNOSIS — Z96.651 PRESENCE OF ARTIFICIAL KNEE JOINT, RIGHT: Primary | ICD-10-CM

## 2019-07-30 ENCOUNTER — OFFICE VISIT (OUTPATIENT)
Dept: PHYSICAL THERAPY | Age: 69
End: 2019-07-30
Attending: NURSE PRACTITIONER
Payer: MEDICARE

## 2019-07-30 DIAGNOSIS — Z96.651 PRESENCE OF ARTIFICIAL KNEE JOINT, RIGHT: ICD-10-CM

## 2019-07-30 PROCEDURE — 97110 THERAPEUTIC EXERCISES: CPT

## 2019-07-30 PROCEDURE — 97161 PT EVAL LOW COMPLEX 20 MIN: CPT

## 2019-07-30 NOTE — PROGRESS NOTES
POST-OP KNEE EVALUATION:   Referring Physician: FUNMILAYO Pena  Diagnosis: s/p R TKA 7/16/19     Date of Service: 7/30/2019     PATIENT SUMMARY   Serge Duverney is a 70year old female who presents to therapy today s/p R TKA on  7/16/19 with complain Skilled Physical Therapy is medically necessary to address the above impairments and reach functional goals. Precautions:  None  OBJECTIVE:   Observation/Skin: Pt came with 2WW, ENOC LE in compression stockings. Incision covered. PT changed dressing.  We with 1 UE assist  · Pt will demonstrate increased knee strength to 5/5 to be able to squat to  items around the house  · Pt will improve SLS to >5s to improve safety and independence with gait on uneven surfaces such as grass  · Pt will be independe

## 2019-07-30 NOTE — PATIENT INSTRUCTIONS
Access Code: [facility]   URL: [email or web address]   Date: 07/30/2019   Prepared by: Bradley      Exercises  Standing Hip Flexion with Resistance Loop - 10 reps - 2 sets - 2x daily - 5x weekly  Hip Abduction with Resistance Loop - 10 reps

## 2019-07-31 ENCOUNTER — OFFICE VISIT (OUTPATIENT)
Dept: PHYSICAL THERAPY | Age: 69
End: 2019-07-31
Attending: NURSE PRACTITIONER
Payer: MEDICARE

## 2019-07-31 PROCEDURE — 97110 THERAPEUTIC EXERCISES: CPT

## 2019-07-31 PROCEDURE — 97140 MANUAL THERAPY 1/> REGIONS: CPT

## 2019-07-31 NOTE — PROGRESS NOTES
Dx:  s/p R TKA 7/16/19           Insurance (Authorized # of Visits):  Medicare           Authorizing Physician: Dr. Milan Shelby ref.  provider found  Next MD visit: 8/6/19  Fall Risk: standard         Precautions: n/a             Subjective: Felt a little sore after

## 2019-08-01 ENCOUNTER — OFFICE VISIT (OUTPATIENT)
Dept: PHYSICAL THERAPY | Age: 69
End: 2019-08-01
Attending: INTERNAL MEDICINE
Payer: MEDICARE

## 2019-08-01 ENCOUNTER — APPOINTMENT (OUTPATIENT)
Dept: PHYSICAL THERAPY | Age: 69
End: 2019-08-01
Payer: MEDICARE

## 2019-08-01 ENCOUNTER — OFFICE VISIT (OUTPATIENT)
Dept: INTERNAL MEDICINE CLINIC | Facility: CLINIC | Age: 69
End: 2019-08-01
Payer: MEDICARE

## 2019-08-01 VITALS
TEMPERATURE: 98 F | DIASTOLIC BLOOD PRESSURE: 58 MMHG | HEIGHT: 65 IN | BODY MASS INDEX: 28.82 KG/M2 | RESPIRATION RATE: 16 BRPM | SYSTOLIC BLOOD PRESSURE: 120 MMHG | HEART RATE: 78 BPM | OXYGEN SATURATION: 97 % | WEIGHT: 173 LBS

## 2019-08-01 DIAGNOSIS — K59.03 DRUG-INDUCED CONSTIPATION: ICD-10-CM

## 2019-08-01 DIAGNOSIS — Z96.651 S/P TOTAL KNEE REPLACEMENT, RIGHT: Primary | ICD-10-CM

## 2019-08-01 PROCEDURE — 97140 MANUAL THERAPY 1/> REGIONS: CPT

## 2019-08-01 PROCEDURE — 99213 OFFICE O/P EST LOW 20 MIN: CPT | Performed by: INTERNAL MEDICINE

## 2019-08-01 PROCEDURE — 97150 GROUP THERAPEUTIC PROCEDURES: CPT

## 2019-08-01 PROCEDURE — 97112 NEUROMUSCULAR REEDUCATION: CPT

## 2019-08-01 NOTE — PROGRESS NOTES
[facility] Internal Medicine Office Note  Chief Complaint:   Patient presents with: Follow - Up: knee surgery. Knee replacement because of arthritis. july 16 as surgery. pt says doing well.        HPI:   This is a 70year old female coming in for Medications:  Alpha-Lipoic Acid 200 MG Oral Cap Take 1 capsule by mouth daily. Disp:  Rfl:    Ezetimibe-Simvastatin 10-40 MG Oral Tab Take 1 tablet by mouth nightly.  Disp: 90 tablet Rfl: 3   Sertraline HCl 100 MG Oral Tab Take 1 tablet (100 mg total) by mo posterior oropharyngeal erythema. Eyes: Conjunctivae are normal.   Neck: Neck supple. Cardiovascular: Normal rate, regular rhythm and normal heart sounds.     Pulmonary/Chest: Effort normal and breath sounds normal.   Musculoskeletal:   Dressing in plac

## 2019-08-01 NOTE — PROGRESS NOTES
Dx:  s/p R TKA 7/16/19           Insurance (Authorized # of Visits):  Medicare           Authorizing Physician: Dr. Wanda Pino Next MD visit: 8/6/19  Fall Risk: standard         Precautions: n/a             Subjective: Pt reports she was sore after yesterday's heel slides with SB and strap x15  SLR with TKE 2x10 R  bridges 2x10  clams 1x12 R  3-way hip x12 ea R/L  4 inch step ups 2 UE x10      - Neuro re-ed  Tandem stance 2x30s ea (fair R back)  SLS cone taps (1) x10 ea (fair R)      Gait training  Blaire Titus

## 2019-08-05 ENCOUNTER — APPOINTMENT (OUTPATIENT)
Dept: PHYSICAL THERAPY | Age: 69
End: 2019-08-05
Payer: MEDICARE

## 2019-08-06 ENCOUNTER — OFFICE VISIT (OUTPATIENT)
Dept: PHYSICAL THERAPY | Age: 69
End: 2019-08-06
Attending: INTERNAL MEDICINE
Payer: MEDICARE

## 2019-08-06 PROCEDURE — 97140 MANUAL THERAPY 1/> REGIONS: CPT

## 2019-08-06 PROCEDURE — 97112 NEUROMUSCULAR REEDUCATION: CPT

## 2019-08-06 PROCEDURE — 97110 THERAPEUTIC EXERCISES: CPT

## 2019-08-06 NOTE — PROGRESS NOTES
Dx:  s/p R TKA 7/16/19           Insurance (Authorized # of Visits):  Medicare           Authorizing Physician: Dr. Liliana Dsouza Next MD visit: 8/6/19  Fall Risk: standard         Precautions: n/a             Subjective: Saw the Ortho follow-up this morning.  Appt knee flex x4 min  STM R knee x8 min Manual  PROM knee ext x2 min  - flex x4 min  STM R knee x5 min     Therex  Bike L1 partial revolution x5 min  Supine heel slides (reviewed)  SLR with TKE 2x10 R  bridges 2x10  clams 2x10 R  3-way hip x12 ea R/L  4 inch s

## 2019-08-08 ENCOUNTER — OFFICE VISIT (OUTPATIENT)
Dept: PHYSICAL THERAPY | Age: 69
End: 2019-08-08
Attending: INTERNAL MEDICINE
Payer: MEDICARE

## 2019-08-08 PROCEDURE — 97140 MANUAL THERAPY 1/> REGIONS: CPT

## 2019-08-08 PROCEDURE — 97110 THERAPEUTIC EXERCISES: CPT

## 2019-08-08 NOTE — PROGRESS NOTES
Dx:  s/p R TKA 7/16/19           Insurance (Authorized # of Visits):  Medicare           Authorizing Physician: Dr. Skyler Doran Next MD visit: 8/6/19  Fall Risk: standard         Precautions: n/a             Subjective: Feeling a little more stiff today.   Therex  Bike L1 partial revolution x5 min  Supine heel slides (reviewed)  SLR with TKE 2x10 R  bridges 2x10  clams 2x10 R  3-way hip x12 ea R/L  4 inch step ups 2 UE x10  Therex  Bike L1 partial revolution x5 min  Supine heel slides with SB and strap x15

## 2019-08-12 ENCOUNTER — APPOINTMENT (OUTPATIENT)
Dept: PHYSICAL THERAPY | Age: 69
End: 2019-08-12
Payer: MEDICARE

## 2019-08-13 ENCOUNTER — OFFICE VISIT (OUTPATIENT)
Dept: PHYSICAL THERAPY | Age: 69
End: 2019-08-13
Attending: NURSE PRACTITIONER
Payer: MEDICARE

## 2019-08-13 PROCEDURE — 97116 GAIT TRAINING THERAPY: CPT

## 2019-08-13 PROCEDURE — 97110 THERAPEUTIC EXERCISES: CPT

## 2019-08-13 NOTE — PROGRESS NOTES
Dx:  s/p R TKA 7/16/19           Insurance (Authorized # of Visits):  Medicare           Authorizing Physician: Dr. Lo Feng Next MD visit: 8/27/19  Fall Risk: standard         Precautions: n/a             Subjective: Saw the pain physician and he wanted her Tx#: 6/12   Manual  PROM knee flex x4 min  STM R knee x6 min Manual  PROM knee flex x4 min  STM R knee x8 min Manual  PROM knee ext x2 min  - flex x4 min  STM R knee x5 min Manual  PROM knee ext x2 min  - flex x3 min  STM R knee x8 min Manual  PROM knee ex Modalities  Ice x10 min R knee Ice at home Ice at home Ice at home   HEP: Supine heel slides, SLR with TKE, 3-way hip, bridges, clams    Charges:  therex x3, gait x1       Total Timed Treatment: 55 min  Total Treatment Time: 55 min

## 2019-08-14 ENCOUNTER — APPOINTMENT (OUTPATIENT)
Dept: PHYSICAL THERAPY | Age: 69
End: 2019-08-14
Payer: MEDICARE

## 2019-08-15 ENCOUNTER — APPOINTMENT (OUTPATIENT)
Dept: PHYSICAL THERAPY | Age: 69
End: 2019-08-15
Payer: MEDICARE

## 2019-08-15 ENCOUNTER — OFFICE VISIT (OUTPATIENT)
Dept: PHYSICAL THERAPY | Age: 69
End: 2019-08-15
Attending: INTERNAL MEDICINE
Payer: MEDICARE

## 2019-08-15 PROCEDURE — 97140 MANUAL THERAPY 1/> REGIONS: CPT

## 2019-08-15 PROCEDURE — 97116 GAIT TRAINING THERAPY: CPT

## 2019-08-15 PROCEDURE — 97110 THERAPEUTIC EXERCISES: CPT

## 2019-08-15 NOTE — PROGRESS NOTES
Dx:  s/p R TKA 7/16/19           Insurance (Authorized # of Visits):  Medicare           Authorizing Physician: Dr. Griselda Monsivais Next MD visit: 8/27/19  Fall Risk: standard         Precautions: n/a             Subjective: Saw Dr. Griselda Monsivais yesterday; he said the inci Manual  PROM knee flex x4 min  STM R knee x8 min Manual  PROM knee ext x2 min  - flex x4 min  STM R knee x5 min Manual  PROM knee ext x2 min  - flex x3 min  STM R knee x8 min Manual  PROM knee ext x2 min  - flex x3 min  STM R knee - Held due to concern of Forward x2 laps - Gait training  Hurdles Forward x2 laps Gait training  Obstacle course stepping over uneven airex (2), 4 inch step up and over, hurdles (2) x3 laps (SBA) Gait training  Gait with squats to  cones x12 (good symmetry/wt bearing)  Gait

## 2019-08-19 ENCOUNTER — APPOINTMENT (OUTPATIENT)
Dept: PHYSICAL THERAPY | Age: 69
End: 2019-08-19
Payer: MEDICARE

## 2019-08-20 ENCOUNTER — OFFICE VISIT (OUTPATIENT)
Dept: PHYSICAL THERAPY | Age: 69
End: 2019-08-20
Attending: INTERNAL MEDICINE
Payer: MEDICARE

## 2019-08-20 PROCEDURE — 97140 MANUAL THERAPY 1/> REGIONS: CPT

## 2019-08-20 PROCEDURE — 97112 NEUROMUSCULAR REEDUCATION: CPT

## 2019-08-20 PROCEDURE — 97110 THERAPEUTIC EXERCISES: CPT

## 2019-08-20 NOTE — PROGRESS NOTES
Dx:  s/p R TKA 7/16/19           Insurance (Authorized # of Visits):  Medicare           Authorizing Physician: Dr. Liliana Dsouza Next MD visit: 8/27/19  Fall Risk: standard         Precautions: n/a             Subjective: Knee was hurting a lot on the medial side 7/31/2019  TX#: 2/12 Date: 8/1/2019               TX#: 3/12 Date: 8/6/2019               TX#: 4/12 Date: 8/8/2019              TX#: 5/12 Date: 8/13/2019   Tx#: 6/12 Date: 8/15/2019  Tx#: 7/12 Date: 8/20/2019  Tx#: 8/12   Manual  PROM knee flex x4 min  STM outside of //bars)    ENOC heel raises x20  Stool scoots A/P 30 ft x2 laps  Therex  Bike L1 full revolution x7 min  Supine heel slides with SB and strap -x15  SLR with TKE x15 R    Shuttle ENOC press 5 bands 1x20  -R only 3 bands 1x20    6 inch step up lead

## 2019-08-21 ENCOUNTER — APPOINTMENT (OUTPATIENT)
Dept: PHYSICAL THERAPY | Age: 69
End: 2019-08-21
Payer: MEDICARE

## 2019-08-22 ENCOUNTER — OFFICE VISIT (OUTPATIENT)
Dept: PHYSICAL THERAPY | Age: 69
End: 2019-08-22
Attending: INTERNAL MEDICINE
Payer: MEDICARE

## 2019-08-22 PROCEDURE — 97116 GAIT TRAINING THERAPY: CPT

## 2019-08-22 PROCEDURE — 97140 MANUAL THERAPY 1/> REGIONS: CPT

## 2019-08-22 PROCEDURE — 97112 NEUROMUSCULAR REEDUCATION: CPT

## 2019-08-22 NOTE — PROGRESS NOTES
Dx:  s/p R TKA 7/16/19           Insurance (Authorized # of Visits):  Medicare           Authorizing Physician: Dr. Bárbara Ramos Next MD visit: 8/27/19  Fall Risk: standard         Precautions: n/a             Subjective: Pt reports her knee was more sore prior t flex x3 min  STM R knee - Held due to concern of infection Manual  PROM knee ext x2 min  - flex x3 min  STM R knee x5 min Manual  Gentle femorotibial distract and ant glide in hooklying x3 min  PROM knee ext with gentle tibial ER and femur IR x2 min  STM R over no UE x10 ea Neuro re-ed  SLS cone taps (3) 2x5 ea (fair)  Tilt board A/P and M/L wt shifts x15 ea (UE 50% and CGA)  Bosu blue up lunges x15 (UE 50%)  Neuro re-ed  SLS cone taps standing on airex (1) 2x105 ea   Tilt board A/P and M/L wt shifts x15 ea

## 2019-08-26 ENCOUNTER — APPOINTMENT (OUTPATIENT)
Dept: PHYSICAL THERAPY | Age: 69
End: 2019-08-26
Payer: MEDICARE

## 2019-08-27 ENCOUNTER — OFFICE VISIT (OUTPATIENT)
Dept: PHYSICAL THERAPY | Age: 69
End: 2019-08-27
Attending: NURSE PRACTITIONER
Payer: MEDICARE

## 2019-08-27 PROCEDURE — 97140 MANUAL THERAPY 1/> REGIONS: CPT

## 2019-08-27 PROCEDURE — 97112 NEUROMUSCULAR REEDUCATION: CPT

## 2019-08-27 PROCEDURE — 97110 THERAPEUTIC EXERCISES: CPT

## 2019-08-27 NOTE — PROGRESS NOTES
Progress/Discharge Summary  Pt has attended 10 visits in Physical Therapy.    Dx:  s/p R TKA 7/16/19           Insurance (Authorized # of Visits):  Medicare           Authorizing Physician: Dr. Jorge Alberto Felix Next MD visit: Nov  Fall Risk: standard         Precaut x/week or a total of 2 additional visits over a 90 day period. Then D/C with continued compliance to HEP.  Treatment will include: manual therapy, therex, neuro re-ed, gait training, HEP       Patient/Family/Caregiver was advised of these findings, precauti lead L x10 2 UE Therex  Bike L1 full revolution x5 min  Supine heel slides with SB and strap -HELD  SLR with TKE x15 R  Shuttle ENOC press 4 bands 2x10  YTB side step 20 ft x2 laps  6 inch step down lead L x15 2 UE Therex  Bike L1 full revolution x7 min  SL balance 2x30s (fair)  -mini squat x10 (fair)  Hurdles (6, 12, 6 inch)  -FWD x3 laps   -LAT x3 laps   Y balance taps 2x5 ea  SLS with ball toss x2 ea   Gait training  Hurdles Forward x2 laps Gait training  Obstacle course stepping over uneven airex (2), 4 i

## 2019-08-28 ENCOUNTER — APPOINTMENT (OUTPATIENT)
Dept: PHYSICAL THERAPY | Age: 69
End: 2019-08-28
Payer: MEDICARE

## 2019-08-29 ENCOUNTER — APPOINTMENT (OUTPATIENT)
Dept: PHYSICAL THERAPY | Age: 69
End: 2019-08-29
Attending: INTERNAL MEDICINE
Payer: MEDICARE

## 2019-09-03 ENCOUNTER — OFFICE VISIT (OUTPATIENT)
Dept: PHYSICAL THERAPY | Age: 69
End: 2019-09-03
Attending: NURSE PRACTITIONER
Payer: MEDICARE

## 2019-09-03 PROCEDURE — 97112 NEUROMUSCULAR REEDUCATION: CPT

## 2019-09-03 PROCEDURE — 97116 GAIT TRAINING THERAPY: CPT

## 2019-09-03 PROCEDURE — 97110 THERAPEUTIC EXERCISES: CPT

## 2019-09-03 NOTE — PROGRESS NOTES
Dx:  s/p R TKA 7/16/19           Insurance (Authorized # of Visits):  Medicare           Authorizing Physician: Dr. Dianna Lomas Next MD visit: Nov  Fall Risk: standard         Precautions: n/a             Subjective  Pt missed last appt due to a 24 hr stomach bu 11/12   Manual  PROM knee ext x2 min  - flex x3 min  STM R knee - Held due to concern of infection Manual  PROM knee ext x2 min  - flex x3 min  STM R knee x5 min Manual  Gentle femorotibial distract and ant glide in hooklying x3 min  PROM knee ext with gen shifts x15 ea (UE 50% and CGA)  Bosu blue up lunges x15 (UE 50%)  Neuro re-ed  SLS cone taps standing on airex (1) 2x10 ea   Tilt board A/P and M/L wt shifts x15 ea (UE 25% and CGA)  Bosu blue up lunges alt LE x15 (UE 10%)  Neuro re-ed  SLS cone taps stand

## 2019-09-05 ENCOUNTER — APPOINTMENT (OUTPATIENT)
Dept: PHYSICAL THERAPY | Age: 69
End: 2019-09-05
Payer: MEDICARE

## 2019-09-10 ENCOUNTER — APPOINTMENT (OUTPATIENT)
Dept: PHYSICAL THERAPY | Age: 69
End: 2019-09-10
Payer: MEDICARE

## 2019-09-11 ENCOUNTER — APPOINTMENT (OUTPATIENT)
Dept: PHYSICAL THERAPY | Age: 69
End: 2019-09-11
Payer: MEDICARE

## 2019-09-18 ENCOUNTER — APPOINTMENT (OUTPATIENT)
Dept: PHYSICAL THERAPY | Age: 69
End: 2019-09-18
Payer: MEDICARE

## 2019-09-19 ENCOUNTER — APPOINTMENT (OUTPATIENT)
Dept: PHYSICAL THERAPY | Age: 69
End: 2019-09-19
Payer: MEDICARE

## 2019-10-19 ENCOUNTER — OFFICE VISIT (OUTPATIENT)
Dept: FAMILY MEDICINE CLINIC | Facility: CLINIC | Age: 69
End: 2019-10-19
Payer: MEDICARE

## 2019-10-19 VITALS
RESPIRATION RATE: 16 BRPM | WEIGHT: 171 LBS | BODY MASS INDEX: 28.49 KG/M2 | OXYGEN SATURATION: 98 % | DIASTOLIC BLOOD PRESSURE: 82 MMHG | HEIGHT: 65 IN | TEMPERATURE: 99 F | SYSTOLIC BLOOD PRESSURE: 122 MMHG | HEART RATE: 95 BPM

## 2019-10-19 DIAGNOSIS — J01.00 ACUTE NON-RECURRENT MAXILLARY SINUSITIS: Primary | ICD-10-CM

## 2019-10-19 PROCEDURE — 99213 OFFICE O/P EST LOW 20 MIN: CPT | Performed by: FAMILY MEDICINE

## 2019-10-19 RX ORDER — DOXYCYCLINE HYCLATE 100 MG/1
100 CAPSULE ORAL 2 TIMES DAILY
Qty: 14 CAPSULE | Refills: 0 | Status: SHIPPED | OUTPATIENT
Start: 2019-10-19 | End: 2020-01-31

## 2019-10-19 RX ORDER — FLUTICASONE PROPIONATE 50 MCG
2 SPRAY, SUSPENSION (ML) NASAL DAILY
Qty: 1 BOTTLE | Refills: 1 | Status: SHIPPED | OUTPATIENT
Start: 2019-10-19 | End: 2020-01-31

## 2019-10-19 NOTE — PATIENT INSTRUCTIONS
Acute Bacterial Rhinosinusitis (ABRS)    Acute bacterial rhinosinusitis (ABRS) is an infection of your nasal cavity and sinuses. It’s caused by bacteria. Acute means that you’ve had symptoms for less than 4 weeks, but possibly up to 12 weeks.   Understand · Nasal corticosteroid medicine. Drops or spray used in the nose can lessen swelling and congestion. · Over-the-counter pain medicine. This is to lessen sinus pain and pressure. · Nasal decongestant medicine. Spray or drops may help to lessen congestion. The sinuses are air-filled spaces within the bones of the face. They connect to the inside of the nose. Sinusitis is an inflammation of the tissue that lines the sinuses. Sinusitis can occur during a cold.  It can also happen due to allergies to pollens and · Do not use nasal rinses or irrigation during an acute sinus infection, unless your healthcare provider tells you to. Rinsing may spread the infection to other areas in your sinuses.   · Use acetaminophen or ibuprofen to control pain, unless another pain m

## 2019-10-19 NOTE — PROGRESS NOTES
Star Parks is a 70year old female. S:  Patient presents today with the following concerns:  Cough (wet, s/s for 5 days. OTC meds taken)   Post Nasal Drip      · Unable to get mucous out. No SOB or wheezing. · Ears are clogged.   · Pressure in hea cancer    • Colon Cancer Mother    • Cancer Paternal Grandmother 39        breast   • Heart Disorder Paternal Grandfather        REVIEW OF SYSTEMS:  GENERAL: feels well otherwise  SKIN: denies any unusual skin lesions  EYES:denies vision change  LUNGS: den improve. Patient verbalizes understanding of treatment plan.

## 2019-11-05 DIAGNOSIS — F41.9 ANXIETY: ICD-10-CM

## 2019-11-05 RX ORDER — SERTRALINE HYDROCHLORIDE 100 MG/1
TABLET, FILM COATED ORAL
Qty: 90 TABLET | Refills: 3 | Status: SHIPPED | OUTPATIENT
Start: 2019-11-05 | End: 2020-10-12

## 2019-11-05 NOTE — TELEPHONE ENCOUNTER
Sertraline 100 mg 1 tab daily filled 4-9-19 90 with 1 refill     LOV 8-1-19   No upcoming apt on file   Labs 8-6-18

## 2020-01-31 ENCOUNTER — OFFICE VISIT (OUTPATIENT)
Dept: INTERNAL MEDICINE CLINIC | Facility: CLINIC | Age: 70
End: 2020-01-31
Payer: MEDICARE

## 2020-01-31 VITALS
TEMPERATURE: 98 F | SYSTOLIC BLOOD PRESSURE: 122 MMHG | BODY MASS INDEX: 29.99 KG/M2 | WEIGHT: 180 LBS | RESPIRATION RATE: 16 BRPM | HEART RATE: 81 BPM | DIASTOLIC BLOOD PRESSURE: 70 MMHG | OXYGEN SATURATION: 97 % | HEIGHT: 65 IN

## 2020-01-31 DIAGNOSIS — F41.9 ANXIETY: ICD-10-CM

## 2020-01-31 DIAGNOSIS — M54.10 RADICULAR PAIN: Primary | ICD-10-CM

## 2020-01-31 DIAGNOSIS — E78.00 PURE HYPERCHOLESTEROLEMIA: ICD-10-CM

## 2020-01-31 DIAGNOSIS — R53.83 FATIGUE, UNSPECIFIED TYPE: ICD-10-CM

## 2020-01-31 PROCEDURE — 99214 OFFICE O/P EST MOD 30 MIN: CPT | Performed by: INTERNAL MEDICINE

## 2020-01-31 RX ORDER — DICLOFENAC SODIUM 75 MG/1
75 TABLET, DELAYED RELEASE ORAL 2 TIMES DAILY
Qty: 20 TABLET | Refills: 0 | Status: SHIPPED | OUTPATIENT
Start: 2020-01-31 | End: 2021-05-27

## 2020-01-31 RX ORDER — ALPRAZOLAM 0.25 MG/1
0.25 TABLET ORAL 2 TIMES DAILY PRN
Qty: 60 TABLET | Refills: 3 | Status: SHIPPED | OUTPATIENT
Start: 2020-03-01 | End: 2020-08-14

## 2020-01-31 NOTE — PROGRESS NOTES
[facility] Internal Medicine Office Note  Chief Complaint:   Patient presents with:  Shoulder Pain: arm and shoulder tingling and in pain, pt broke clavical 15 years ago   Wrist Pain: Tylenol or excedrine for pain       HPI:   This is a 78 year o tablet 3   • Alpha-Lipoic Acid 200 MG Oral Cap Take 1 capsule by mouth daily. • Ezetimibe-Simvastatin 10-40 MG Oral Tab Take 1 tablet by mouth nightly. 90 tablet 3   • Calcium Carbonate-Vitamin D (CALCIUM 500/D OR) Take 1 tablet by mouth daily.        • hypercholesterolemia  Fatigue, unspecified type      The plan is as follows  Toni Siegel was seen today for shoulder pain and wrist pain. Diagnoses and all orders for this visit:    Radicular pain - diclofenac BID with food for 10 days.  Discussed bleeding ri or complications from the treatments as a result of today.      Yaquelin Wood MD

## 2020-03-14 ENCOUNTER — OFFICE VISIT (OUTPATIENT)
Dept: INTERNAL MEDICINE CLINIC | Facility: CLINIC | Age: 70
End: 2020-03-14
Payer: MEDICARE

## 2020-03-14 VITALS
BODY MASS INDEX: 29.99 KG/M2 | WEIGHT: 180 LBS | HEIGHT: 65 IN | HEART RATE: 105 BPM | RESPIRATION RATE: 18 BRPM | SYSTOLIC BLOOD PRESSURE: 120 MMHG | OXYGEN SATURATION: 98 % | TEMPERATURE: 99 F | DIASTOLIC BLOOD PRESSURE: 70 MMHG

## 2020-03-14 DIAGNOSIS — J01.40 ACUTE NON-RECURRENT PANSINUSITIS: Primary | ICD-10-CM

## 2020-03-14 PROCEDURE — 99213 OFFICE O/P EST LOW 20 MIN: CPT | Performed by: NURSE PRACTITIONER

## 2020-03-14 RX ORDER — CEFDINIR 300 MG/1
300 CAPSULE ORAL 2 TIMES DAILY
Qty: 20 CAPSULE | Refills: 0 | Status: SHIPPED | OUTPATIENT
Start: 2020-03-14 | End: 2020-03-24

## 2020-03-14 NOTE — PROGRESS NOTES
CHIEF COMPLAINT:   Patient presents with:  Sinus Problem: drainage and ear pain, pressure in sinuses       HPI:   Fannie Gann is a 78year old female who presents for upper respiratory symptoms for  6 days.  Patient reports sore throat only at the beginni Tobacco Use      Smoking status: Former Smoker        Quit date: 1981        Years since quittin.2      Smokeless tobacco: Never Used    Alcohol use: Yes      Comment: social    Drug use: No        REVIEW OF SYSTEMS:   GENERAL: feels well otherwis to help with the coughing. The patient indicates understanding of these issues and agrees to the plan. The patient is asked to return if sx's persist or worsen.

## 2020-03-14 NOTE — PATIENT INSTRUCTIONS
Sinusitis (Antibiotic Treatment)    The sinuses are air-filled spaces within the bones of the face. They connect to the inside of the nose. Sinusitis is an inflammation of the tissue that lines the sinuses. Sinusitis can occur during a cold.  It can also · You can use an over-the-counter decongestant, unless a similar medicine was prescribed to you. Nasal sprays work the fastest. Use one that contains phenylephrine or oxymetazoline. First blow your nose gently. Then use the spray.  Do not use these medicine · Don’t have close contact with people who have sore throats, colds, or other upper respiratory infections. · Don’t smoke, and stay away from secondhand smoke. · Stay up to date with of your vaccines.   Date Last Reviewed: 11/1/2017  © 2377-3270 [employer]

## 2020-04-15 RX ORDER — EZETIMIBE AND SIMVASTATIN 10; 40 MG/1; MG/1
TABLET ORAL
Qty: 90 TABLET | Refills: 3 | Status: SHIPPED | OUTPATIENT
Start: 2020-04-15 | End: 2021-04-02

## 2020-05-07 ENCOUNTER — VIRTUAL PHONE E/M (OUTPATIENT)
Dept: INTERNAL MEDICINE CLINIC | Facility: CLINIC | Age: 70
End: 2020-05-07
Payer: MEDICARE

## 2020-05-07 DIAGNOSIS — L29.2 VULVAR ITCHING: ICD-10-CM

## 2020-05-07 DIAGNOSIS — L29.9 PRURITIC CONDITION: Primary | ICD-10-CM

## 2020-05-07 PROCEDURE — 99442 PHONE E/M BY PHYS 11-20 MIN: CPT | Performed by: INTERNAL MEDICINE

## 2020-05-07 NOTE — PROGRESS NOTES
Virtual Telephone Check-In    Terence Bearden verbally consents to a Virtual/Telephone Check-In visit on 05/07/20. Patient understands and accepts financial responsibility for any deductible, co-insurance and/or co-pays associated with this service.     Du

## 2020-06-10 ENCOUNTER — PATIENT MESSAGE (OUTPATIENT)
Dept: INTERNAL MEDICINE CLINIC | Facility: CLINIC | Age: 70
End: 2020-06-10

## 2020-06-10 DIAGNOSIS — Z12.31 ENCOUNTER FOR SCREENING MAMMOGRAM FOR BREAST CANCER: Primary | ICD-10-CM

## 2020-06-10 NOTE — TELEPHONE ENCOUNTER
From: Jailyn Aleman  To: Damaso Ortiz MD  Sent: 6/10/2020 11:52 AM CDT  Subject: Non-Urgent Medical Question    Dr Mike Feliz,    I would like to have an order for my Mamogram which was due 5/7/20.     Thank you  Jailyn Aleman

## 2020-06-15 ENCOUNTER — HOSPITAL ENCOUNTER (OUTPATIENT)
Dept: MAMMOGRAPHY | Age: 70
Discharge: HOME | End: 2020-06-15
Attending: INTERNAL MEDICINE
Payer: MEDICARE

## 2020-06-15 ENCOUNTER — LAB ENCOUNTER (OUTPATIENT)
Dept: LAB | Age: 70
End: 2020-06-15
Attending: INTERNAL MEDICINE
Payer: MEDICARE

## 2020-06-15 DIAGNOSIS — E78.00 PURE HYPERCHOLESTEROLEMIA: ICD-10-CM

## 2020-06-15 DIAGNOSIS — R53.83 FATIGUE, UNSPECIFIED TYPE: ICD-10-CM

## 2020-06-15 DIAGNOSIS — Z12.31 ENCOUNTER FOR SCREENING MAMMOGRAM FOR BREAST CANCER: ICD-10-CM

## 2020-06-15 PROCEDURE — 36415 COLL VENOUS BLD VENIPUNCTURE: CPT

## 2020-06-15 PROCEDURE — 80053 COMPREHEN METABOLIC PANEL: CPT

## 2020-06-15 PROCEDURE — 77063 BREAST TOMOSYNTHESIS BI: CPT | Performed by: INTERNAL MEDICINE

## 2020-06-15 PROCEDURE — 77067 SCR MAMMO BI INCL CAD: CPT | Performed by: INTERNAL MEDICINE

## 2020-06-15 PROCEDURE — 85025 COMPLETE CBC W/AUTO DIFF WBC: CPT

## 2020-06-15 PROCEDURE — 80061 LIPID PANEL: CPT

## 2020-06-15 PROCEDURE — 84443 ASSAY THYROID STIM HORMONE: CPT

## 2020-08-14 DIAGNOSIS — F41.9 ANXIETY: ICD-10-CM

## 2020-08-14 RX ORDER — ALPRAZOLAM 0.25 MG/1
TABLET ORAL
Qty: 60 TABLET | Refills: 0 | Status: SHIPPED | OUTPATIENT
Start: 2020-08-14 | End: 2020-12-29

## 2020-08-14 NOTE — TELEPHONE ENCOUNTER
Refill requested:   Requested Prescriptions     Pending Prescriptions Disp Refills   • ALPRAZOLAM 0.25 MG Oral Tab [Pharmacy Med Name: ALPRAZOLAM 0.25MG TABLETS] 60 tablet 0     Sig: TAKE 1 TABLET BY MOUTH TWICE DAILY AS NEEDED FOR ANXIETY     Last refill:

## 2020-10-10 DIAGNOSIS — F41.9 ANXIETY: ICD-10-CM

## 2020-10-12 RX ORDER — SERTRALINE HYDROCHLORIDE 100 MG/1
TABLET, FILM COATED ORAL
Qty: 90 TABLET | Refills: 3 | Status: SHIPPED | OUTPATIENT
Start: 2020-10-12 | End: 2021-08-07

## 2020-10-16 ENCOUNTER — LAB ENCOUNTER (OUTPATIENT)
Dept: LAB | Age: 70
End: 2020-10-16
Attending: INTERNAL MEDICINE
Payer: MEDICARE

## 2020-10-16 DIAGNOSIS — Z86.14 PERSONAL HISTORY OF MRSA (METHICILLIN RESISTANT STAPHYLOCOCCUS AUREUS): ICD-10-CM

## 2020-10-16 PROCEDURE — 87081 CULTURE SCREEN ONLY: CPT

## 2020-12-29 ENCOUNTER — TELEPHONE (OUTPATIENT)
Dept: INTERNAL MEDICINE CLINIC | Facility: CLINIC | Age: 70
End: 2020-12-29

## 2020-12-29 DIAGNOSIS — F41.9 ANXIETY: ICD-10-CM

## 2020-12-29 RX ORDER — ALPRAZOLAM 0.25 MG/1
0.25 TABLET ORAL 2 TIMES DAILY PRN
Qty: 60 TABLET | Refills: 1 | Status: SHIPPED | OUTPATIENT
Start: 2020-12-29 | End: 2021-05-27

## 2020-12-29 NOTE — TELEPHONE ENCOUNTER
Refill of alprazolam requested by her  who is in person today at appointment. Refill sent to local pharmacy confirmed by .

## 2021-01-18 ENCOUNTER — TELEPHONE (OUTPATIENT)
Dept: INTERNAL MEDICINE CLINIC | Facility: CLINIC | Age: 71
End: 2021-01-18

## 2021-01-18 NOTE — TELEPHONE ENCOUNTER
Pt last seen son on 1/11. Son started with COVID symptoms on 1/14 and tested on 1/15. Received positive results yesterday. Pt currently with no symptoms. Order for testing?

## 2021-01-18 NOTE — TELEPHONE ENCOUNTER
Consent: The patient's consent was obtained including but not limited to risks of crusting, scabbing, blistering, scarring, darker or lighter pigmentary change, recurrence, incomplete removal and infection. Patient calling states he was exposed to his son x 10 days ago who was positive currently not having any symptoms. Would like to be advised of next steps. Post-Care Instructions: I reviewed with the patient in detail post-care instructions. Patient is to wear sunprotection, and avoid picking at any of the treated lesions. Pt may apply Vaseline to crusted or scabbing areas Medical Necessity Information: It is in your best interest to select a reason for this procedure from the list below. All of these items fulfill various CMS LCD requirements except the new and changing color options. Topical Anesthesia Type: 2.5% lidocaine, 2.5% prilocaine Include Z78.9 (Other Specified Conditions Influencing Health Status) As An Associated Diagnosis?: No Medical Necessity Clause: This procedure was medically necessary because the lesions that were treated were: Detail Level: Simple Herr: 2.5 Length Of Topical Anesthesia Application (Optional): 30 minutes

## 2021-01-18 NOTE — TELEPHONE ENCOUNTER
COVID infection can be contagious 3 days prior to onset of symptoms but it is more likely to be contagious in the 48 hours prior to onset of symptoms. She and her son were both wearing masks so this makes the risk of infection very low.  I would advise Marissa Ha

## 2021-02-07 ENCOUNTER — LAB ENCOUNTER (OUTPATIENT)
Dept: LAB | Facility: HOSPITAL | Age: 71
End: 2021-02-07
Attending: INTERNAL MEDICINE
Payer: MEDICARE

## 2021-02-07 DIAGNOSIS — Z01.818 PRE-OP TESTING: ICD-10-CM

## 2021-02-08 LAB — SARS-COV-2 RNA RESP QL NAA+PROBE: NOT DETECTED

## 2021-02-10 ENCOUNTER — ANESTHESIA EVENT (OUTPATIENT)
Dept: ENDOSCOPY | Facility: HOSPITAL | Age: 71
End: 2021-02-10
Payer: MEDICARE

## 2021-02-10 ENCOUNTER — ANESTHESIA (OUTPATIENT)
Dept: ENDOSCOPY | Facility: HOSPITAL | Age: 71
End: 2021-02-10
Payer: MEDICARE

## 2021-02-10 ENCOUNTER — HOSPITAL ENCOUNTER (OUTPATIENT)
Facility: HOSPITAL | Age: 71
Setting detail: HOSPITAL OUTPATIENT SURGERY
Discharge: HOME | End: 2021-02-10
Attending: INTERNAL MEDICINE | Admitting: INTERNAL MEDICINE
Payer: MEDICARE

## 2021-02-10 VITALS
WEIGHT: 185 LBS | DIASTOLIC BLOOD PRESSURE: 66 MMHG | TEMPERATURE: 99 F | RESPIRATION RATE: 16 BRPM | HEIGHT: 65 IN | BODY MASS INDEX: 30.82 KG/M2 | HEART RATE: 66 BPM | SYSTOLIC BLOOD PRESSURE: 113 MMHG | OXYGEN SATURATION: 100 %

## 2021-02-10 DIAGNOSIS — Z01.818 PRE-OP TESTING: Primary | ICD-10-CM

## 2021-02-10 DIAGNOSIS — Z86.010 PERSONAL HISTORY OF COLONIC POLYPS: ICD-10-CM

## 2021-02-10 PROCEDURE — 0DBL8ZX EXCISION OF TRANSVERSE COLON, VIA NATURAL OR ARTIFICIAL OPENING ENDOSCOPIC, DIAGNOSTIC: ICD-10-PCS | Performed by: INTERNAL MEDICINE

## 2021-02-10 PROCEDURE — 0DBK8ZX EXCISION OF ASCENDING COLON, VIA NATURAL OR ARTIFICIAL OPENING ENDOSCOPIC, DIAGNOSTIC: ICD-10-PCS | Performed by: INTERNAL MEDICINE

## 2021-02-10 PROCEDURE — 88305 TISSUE EXAM BY PATHOLOGIST: CPT | Performed by: INTERNAL MEDICINE

## 2021-02-10 RX ORDER — SODIUM CHLORIDE, SODIUM LACTATE, POTASSIUM CHLORIDE, CALCIUM CHLORIDE 600; 310; 30; 20 MG/100ML; MG/100ML; MG/100ML; MG/100ML
INJECTION, SOLUTION INTRAVENOUS CONTINUOUS
Status: DISCONTINUED | OUTPATIENT
Start: 2021-02-10 | End: 2021-02-10

## 2021-02-10 RX ORDER — NALOXONE HYDROCHLORIDE 0.4 MG/ML
80 INJECTION, SOLUTION INTRAMUSCULAR; INTRAVENOUS; SUBCUTANEOUS AS NEEDED
Status: DISCONTINUED | OUTPATIENT
Start: 2021-02-10 | End: 2021-02-10

## 2021-02-10 RX ORDER — HYDROMORPHONE HYDROCHLORIDE 1 MG/ML
0.4 INJECTION, SOLUTION INTRAMUSCULAR; INTRAVENOUS; SUBCUTANEOUS EVERY 5 MIN PRN
Status: DISCONTINUED | OUTPATIENT
Start: 2021-02-10 | End: 2021-02-10

## 2021-02-10 RX ORDER — LIDOCAINE HYDROCHLORIDE 10 MG/ML
INJECTION, SOLUTION EPIDURAL; INFILTRATION; INTRACAUDAL; PERINEURAL AS NEEDED
Status: DISCONTINUED | OUTPATIENT
Start: 2021-02-10 | End: 2021-02-10 | Stop reason: SURG

## 2021-02-10 RX ADMIN — LIDOCAINE HYDROCHLORIDE 50 MG: 10 INJECTION, SOLUTION EPIDURAL; INFILTRATION; INTRACAUDAL; PERINEURAL at 12:29:00

## 2021-02-10 RX ADMIN — SODIUM CHLORIDE, SODIUM LACTATE, POTASSIUM CHLORIDE, CALCIUM CHLORIDE: 600; 310; 30; 20 INJECTION, SOLUTION INTRAVENOUS at 13:03:00

## 2021-02-10 NOTE — ANESTHESIA PREPROCEDURE EVALUATION
PRE-OP EVALUATION    Patient Name: Del Foote    Pre-op Diagnosis: Personal history of colonic polyps [Z86.010]    Procedure(s):  Colonoscopy     Surgeon(s) and Role:     Ailyn Mayfield MD - Primary    Pre-op vitals reviewed.         Body mass Cardiovascular    Cardiovascular exam normal.         Dental    No notable dental history. Pulmonary    Pulmonary exam normal.                 Other findings            ASA: 2   Plan: MAC  NPO status verified and patient meets guidelines.     Post-p

## 2021-02-10 NOTE — OPERATIVE REPORT
Operative Report-Colonoscopy    PREOPERATIVE DIAGNOSIS/INDICATION:  1. Personal history of colon polyps  2. Family history of colon cancer-mother  POSTOPERTATIVE DIAGNOSIS:  1. Colon polyps  2.  Internal Hemo Lorne Berry  2/10/2021  1:03 PM

## 2021-02-10 NOTE — H&P
History & Physical Examination    Patient Name: Scout Leiva  MRN: [id number]  CSN: [id number]  YOB: 1950    Diagnosis: Personal history of colon polyps.    Family history of colon cancer-mother    Present Illness: Same    Meds:  ALPRAZolam Years ago     Auto   • Indigestion     occasionally   • Irregular bowel habits for years    stress, traveling   • Leaking of urine recently    at night   • Pain in joints 2010   • Sleep disturbance 2016    occasionally   • Stress    • Wears glasses    • We patient/family. They understand and agree to proceed with plan of care. [  ] I have reviewed the History and Physical done within the last 30 days. Any changes noted above.     Recommendation: Proceed with  colonoscopy    Jeanette Montes MD  2/10/20

## 2021-02-10 NOTE — ANESTHESIA POSTPROCEDURE EVALUATION
[facility] Patient Status:  Hospital Outpatient Surgery   Age/Gender 69year old female MRN [id number]   Location [address] [place] [address]. Attending Cate Schmitt MD   Hosp Day # 0 PCP Christina Jean-Baptiste MD       Anesthesia Post-

## 2021-03-13 DIAGNOSIS — Z23 NEED FOR VACCINATION: ICD-10-CM

## 2021-04-01 ENCOUNTER — MED REC SCAN ONLY (OUTPATIENT)
Dept: INTERNAL MEDICINE CLINIC | Facility: CLINIC | Age: 71
End: 2021-04-01

## 2021-04-02 RX ORDER — EZETIMIBE AND SIMVASTATIN 10; 40 MG/1; MG/1
TABLET ORAL
Qty: 90 TABLET | Refills: 3 | Status: SHIPPED | OUTPATIENT
Start: 2021-04-02

## 2021-04-02 NOTE — TELEPHONE ENCOUNTER
AWV scheduled with pt for 4/30     Protocol failed   Medication(s) to Refill:   Requested Prescriptions     Pending Prescriptions Disp Refills   • EZETIMIBE-SIMVASTATIN 10-40 MG Oral Tab [Pharmacy Med Name: EZETIMIBE/SIMVASTATIN TABS 10/40MG] 90 tablet 3

## 2021-05-17 PROBLEM — K64.3 GRADE IV HEMORRHOIDS: Status: ACTIVE | Noted: 2021-05-17

## 2021-05-25 ENCOUNTER — OFFICE VISIT (OUTPATIENT)
Dept: SURGERY | Facility: CLINIC | Age: 71
End: 2021-05-25
Payer: MEDICARE

## 2021-05-25 VITALS
BODY MASS INDEX: 33 KG/M2 | WEIGHT: 190 LBS | DIASTOLIC BLOOD PRESSURE: 80 MMHG | SYSTOLIC BLOOD PRESSURE: 133 MMHG | HEART RATE: 81 BPM

## 2021-05-25 DIAGNOSIS — K62.5 RECTAL BLEEDING: ICD-10-CM

## 2021-05-25 DIAGNOSIS — K64.3 GRADE IV HEMORRHOIDS: Primary | ICD-10-CM

## 2021-05-25 PROCEDURE — 3079F DIAST BP 80-89 MM HG: CPT | Performed by: SURGERY

## 2021-05-25 PROCEDURE — 46600 DIAGNOSTIC ANOSCOPY SPX: CPT | Performed by: SURGERY

## 2021-05-25 PROCEDURE — 3075F SYST BP GE 130 - 139MM HG: CPT | Performed by: SURGERY

## 2021-05-25 PROCEDURE — 99204 OFFICE O/P NEW MOD 45 MIN: CPT | Performed by: SURGERY

## 2021-05-25 NOTE — H&P
New Patient Visit Note       Active Problems      1. Grade IV hemorrhoids    2.  Rectal bleeding        Chief Complaint   Patient presents with:  Rectal Bleeding: pt c/o of intermittent bleeding x 1 yr, black stools, blood clots      History of Present Illn We discussed the anticipated postoperative recovery including dietary activity and exercise restrictions. The patient had multiple questions which were answered to her understanding. Allergies  Carter Doran is allergic to erythromycin.     Past Medical / Lewis Paternal Grandmother 39        breast   • Breast Cancer Paternal Grandmother    • Heart Disorder Paternal Grandfather    • Hypertension Paternal Grandfather         unknown   • Heart Attack Paternal Grandfather          at 71   • Diabetes Maternal [facility] and the [place] 0  •  Alpha-Lipoic Acid 200 MG Oral Cap, Take 1 capsule by mouth daily. , Disp: , Rfl:   •  Calcium Carbonate-Vitamin D (CALCIUM 500/D OR), Take 1 tablet by mouth daily.   , Disp: , Rfl:   •  Loratadine-Pseudoephedrine (CLARITIN-D 24 HOUR OR), Take 1 tablet sounds: S1 normal and S2 normal. No murmur heard. Pulmonary:      Effort: No tachypnea, accessory muscle usage or respiratory distress. Breath sounds: No decreased breath sounds, wheezing, rhonchi or rales.    Genitourinary:     Exam position: Pron proceed. No orders of the defined types were placed in this encounter. Imaging & Referrals   None    Follow Up  Return in about 4 weeks (around 6/22/2021).     Kelli Perry MD

## 2021-05-25 NOTE — H&P (VIEW-ONLY)
New Patient Visit Note       Active Problems      1. Grade IV hemorrhoids    2.  Rectal bleeding        Chief Complaint   Patient presents with:  Rectal Bleeding: pt c/o of intermittent bleeding x 1 yr, black stools, blood clots      History of Present Illn We discussed the anticipated postoperative recovery including dietary activity and exercise restrictions. The patient had multiple questions which were answered to her understanding. Allergies  Desma Fraction is allergic to erythromycin.     Past Medical / Lewis Paternal Grandmother 39        breast   • Breast Cancer Paternal Grandmother    • Heart Disorder Paternal Grandfather    • Hypertension Paternal Grandfather         unknown   • Heart Attack Paternal Grandfather          at 71   • Diabetes Maternal [facility] and the [place] 0  •  Alpha-Lipoic Acid 200 MG Oral Cap, Take 1 capsule by mouth daily. , Disp: , Rfl:   •  Calcium Carbonate-Vitamin D (CALCIUM 500/D OR), Take 1 tablet by mouth daily.   , Disp: , Rfl:   •  Loratadine-Pseudoephedrine (CLARITIN-D 24 HOUR OR), Take 1 tablet sounds: S1 normal and S2 normal. No murmur heard. Pulmonary:      Effort: No tachypnea, accessory muscle usage or respiratory distress. Breath sounds: No decreased breath sounds, wheezing, rhonchi or rales.    Genitourinary:     Exam position: Pron proceed. No orders of the defined types were placed in this encounter. Imaging & Referrals   None    Follow Up  Return in about 4 weeks (around 6/22/2021).     Talisha Nguyen MD

## 2021-05-27 ENCOUNTER — OFFICE VISIT (OUTPATIENT)
Dept: INTERNAL MEDICINE CLINIC | Facility: CLINIC | Age: 71
End: 2021-05-27
Payer: MEDICARE

## 2021-05-27 VITALS
HEART RATE: 80 BPM | BODY MASS INDEX: 32.71 KG/M2 | DIASTOLIC BLOOD PRESSURE: 72 MMHG | SYSTOLIC BLOOD PRESSURE: 122 MMHG | HEIGHT: 64 IN | WEIGHT: 191.63 LBS | OXYGEN SATURATION: 99 % | TEMPERATURE: 98 F | RESPIRATION RATE: 16 BRPM

## 2021-05-27 DIAGNOSIS — Z12.31 ENCOUNTER FOR SCREENING MAMMOGRAM FOR BREAST CANCER: ICD-10-CM

## 2021-05-27 DIAGNOSIS — Z78.0 POSTMENOPAUSAL ESTROGEN DEFICIENCY: ICD-10-CM

## 2021-05-27 DIAGNOSIS — K64.3 GRADE IV HEMORRHOIDS: ICD-10-CM

## 2021-05-27 DIAGNOSIS — R41.3 MEMORY LOSS: ICD-10-CM

## 2021-05-27 DIAGNOSIS — E78.00 PURE HYPERCHOLESTEROLEMIA: ICD-10-CM

## 2021-05-27 DIAGNOSIS — R53.83 FATIGUE, UNSPECIFIED TYPE: ICD-10-CM

## 2021-05-27 DIAGNOSIS — M85.89 OSTEOPENIA OF MULTIPLE SITES: ICD-10-CM

## 2021-05-27 DIAGNOSIS — F41.9 ANXIETY: ICD-10-CM

## 2021-05-27 DIAGNOSIS — M17.0 PRIMARY OSTEOARTHRITIS OF BOTH KNEES: ICD-10-CM

## 2021-05-27 DIAGNOSIS — Z00.00 WELLNESS EXAMINATION: Primary | ICD-10-CM

## 2021-05-27 DIAGNOSIS — M20.42 HAMMER TOE OF LEFT FOOT: ICD-10-CM

## 2021-05-27 PROCEDURE — G0439 PPPS, SUBSEQ VISIT: HCPCS | Performed by: INTERNAL MEDICINE

## 2021-05-27 PROCEDURE — 96160 PT-FOCUSED HLTH RISK ASSMT: CPT | Performed by: INTERNAL MEDICINE

## 2021-05-27 PROCEDURE — 3008F BODY MASS INDEX DOCD: CPT | Performed by: INTERNAL MEDICINE

## 2021-05-27 PROCEDURE — 99397 PER PM REEVAL EST PAT 65+ YR: CPT | Performed by: INTERNAL MEDICINE

## 2021-05-27 PROCEDURE — 3078F DIAST BP <80 MM HG: CPT | Performed by: INTERNAL MEDICINE

## 2021-05-27 PROCEDURE — 3074F SYST BP LT 130 MM HG: CPT | Performed by: INTERNAL MEDICINE

## 2021-05-27 RX ORDER — WHEAT DEXTRIN 3 G/3.8 G
POWDER (GRAM) ORAL NIGHTLY
COMMUNITY

## 2021-05-27 RX ORDER — ALPRAZOLAM 0.25 MG/1
0.25 TABLET ORAL 2 TIMES DAILY PRN
Qty: 60 TABLET | Refills: 3 | Status: SHIPPED | OUTPATIENT
Start: 2021-05-27 | End: 2021-10-20

## 2021-05-27 NOTE — PROGRESS NOTES
REASON FOR VISIT:    Blossom Last is a 69year old female who presents for a MA Supervisit. Mood doing ok  Has some anxiety at times such as when her family, grandchildren coming over.  She notes they had been very isolated with the pandemic and the n Glucosamine-Chondroit-Vit C-Mn (GLUCOSAMINE CHONDR 500 COMPLEX OR) Take 1 tablet by mouth daily.            Glucose and HbA1c Latest Ref Rng & Units 6/15/2020 8/6/2018 11/15/2017   Glucose 70 - 99 mg/dL 96 84 93     Cholesterol Latest Ref Rng & Units 6/15/2 activities? : No  Memory Problems?: Yes      Fall/Risk Assessment     Have you fallen in the last 12 months?: 0-No  Do you have 3 or more medical conditions?: 1-Yes  Do you accidently lose urine?: 1-Yes  Do you have difficulty seeing?: 0-No  Do you have an Medical History:   Diagnosis Date   • Anemia 1980    had hysterectomy   • Anxiety    • Arthritis    • Back pain 2020   • Blood in the stool 2018    occasionally   • Constipation    • Easy bruising always   • Excessive bleeding 2018    hemorrhoids   • [facility] Date(s) Administered    FLUZONE 6 months and older PFS 0.5 ml (45082)                          10/25/2017      Fluvirin, 3 Years & >, Im                          10/15/2013  10/02/2014  10/11/2015      Fluzone Vaccine Medicare () Hearing Assessed via: Finger Rub  EYES: PERRLA, EOMI, conjunctiva are clear  NECK: supple, no adenopathy, no bruits  CHEST: no chest tenderness  LUNGS: clear to auscultation  CARDIO: RRR without murmur  GI: no tenderness  : deferred  RECTAL: deferred  MU 6/18/21    Hammer toe of left foot - chronic, stable    Primary osteoarthritis of both knees - chronic, stable     Rectal bleeding - not actively. Related to hemorrhoids       The patient indicates understanding of these issues and agrees to the plan.   The

## 2021-06-03 RX ORDER — ACETAMINOPHEN 500 MG
1000 TABLET ORAL ONCE
Status: CANCELLED | OUTPATIENT
Start: 2021-06-03 | End: 2021-06-03

## 2021-06-07 ENCOUNTER — LAB ENCOUNTER (OUTPATIENT)
Dept: LAB | Age: 71
End: 2021-06-07
Attending: INTERNAL MEDICINE
Payer: MEDICARE

## 2021-06-07 DIAGNOSIS — R53.83 FATIGUE, UNSPECIFIED TYPE: ICD-10-CM

## 2021-06-07 DIAGNOSIS — E78.00 PURE HYPERCHOLESTEROLEMIA: ICD-10-CM

## 2021-06-07 DIAGNOSIS — R41.3 MEMORY LOSS: ICD-10-CM

## 2021-06-07 PROCEDURE — 36415 COLL VENOUS BLD VENIPUNCTURE: CPT

## 2021-06-07 PROCEDURE — 80061 LIPID PANEL: CPT

## 2021-06-07 PROCEDURE — 84443 ASSAY THYROID STIM HORMONE: CPT

## 2021-06-07 PROCEDURE — 82607 VITAMIN B-12: CPT

## 2021-06-07 PROCEDURE — 85025 COMPLETE CBC W/AUTO DIFF WBC: CPT

## 2021-06-07 PROCEDURE — 80053 COMPREHEN METABOLIC PANEL: CPT

## 2021-06-15 ENCOUNTER — LAB ENCOUNTER (OUTPATIENT)
Dept: LAB | Age: 71
End: 2021-06-15
Attending: SURGERY
Payer: MEDICARE

## 2021-06-15 DIAGNOSIS — K62.5 RECTAL BLEEDING: ICD-10-CM

## 2021-06-18 ENCOUNTER — HOSPITAL ENCOUNTER (OUTPATIENT)
Facility: HOSPITAL | Age: 71
Setting detail: HOSPITAL OUTPATIENT SURGERY
Discharge: HOME | End: 2021-06-18
Attending: SURGERY | Admitting: SURGERY
Payer: MEDICARE

## 2021-06-18 ENCOUNTER — ANESTHESIA EVENT (OUTPATIENT)
Dept: SURGERY | Facility: HOSPITAL | Age: 71
End: 2021-06-18
Payer: MEDICARE

## 2021-06-18 ENCOUNTER — ANESTHESIA (OUTPATIENT)
Dept: SURGERY | Facility: HOSPITAL | Age: 71
End: 2021-06-18
Payer: MEDICARE

## 2021-06-18 VITALS
BODY MASS INDEX: 32.44 KG/M2 | TEMPERATURE: 98 F | SYSTOLIC BLOOD PRESSURE: 143 MMHG | WEIGHT: 190 LBS | HEIGHT: 64 IN | OXYGEN SATURATION: 100 % | DIASTOLIC BLOOD PRESSURE: 68 MMHG | HEART RATE: 78 BPM | RESPIRATION RATE: 16 BRPM

## 2021-06-18 DIAGNOSIS — K64.3 GRADE IV HEMORRHOIDS: ICD-10-CM

## 2021-06-18 DIAGNOSIS — K62.5 RECTAL BLEEDING: Primary | ICD-10-CM

## 2021-06-18 PROCEDURE — 46947 HEMORRHOIDOPEXY BY STAPLING: CPT | Performed by: SURGERY

## 2021-06-18 PROCEDURE — 06BY4ZC EXCISION OF HEMORRHOIDAL PLEXUS, PERCUTANEOUS ENDOSCOPIC APPROACH: ICD-10-PCS | Performed by: SURGERY

## 2021-06-18 RX ORDER — DEXAMETHASONE SODIUM PHOSPHATE 4 MG/ML
VIAL (ML) INJECTION AS NEEDED
Status: DISCONTINUED | OUTPATIENT
Start: 2021-06-18 | End: 2021-06-18 | Stop reason: SURG

## 2021-06-18 RX ORDER — HYDROMORPHONE HYDROCHLORIDE 1 MG/ML
INJECTION, SOLUTION INTRAMUSCULAR; INTRAVENOUS; SUBCUTANEOUS
Status: COMPLETED
Start: 2021-06-18 | End: 2021-06-18

## 2021-06-18 RX ORDER — DOCUSATE SODIUM 100 MG/1
100 CAPSULE, LIQUID FILLED ORAL 3 TIMES DAILY
Qty: 90 CAPSULE | Refills: 0 | Status: SHIPPED | OUTPATIENT
Start: 2021-06-18 | End: 2021-12-28

## 2021-06-18 RX ORDER — HYDROMORPHONE HYDROCHLORIDE 1 MG/ML
0.4 INJECTION, SOLUTION INTRAMUSCULAR; INTRAVENOUS; SUBCUTANEOUS EVERY 5 MIN PRN
Status: DISCONTINUED | OUTPATIENT
Start: 2021-06-18 | End: 2021-06-18

## 2021-06-18 RX ORDER — NALOXONE HYDROCHLORIDE 0.4 MG/ML
80 INJECTION, SOLUTION INTRAMUSCULAR; INTRAVENOUS; SUBCUTANEOUS AS NEEDED
Status: DISCONTINUED | OUTPATIENT
Start: 2021-06-18 | End: 2021-06-18

## 2021-06-18 RX ORDER — LIDOCAINE HYDROCHLORIDE 10 MG/ML
INJECTION, SOLUTION EPIDURAL; INFILTRATION; INTRACAUDAL; PERINEURAL AS NEEDED
Status: DISCONTINUED | OUTPATIENT
Start: 2021-06-18 | End: 2021-06-18 | Stop reason: SURG

## 2021-06-18 RX ORDER — HEPARIN SODIUM 5000 [USP'U]/ML
5000 INJECTION, SOLUTION INTRAVENOUS; SUBCUTANEOUS ONCE
Status: COMPLETED | OUTPATIENT
Start: 2021-06-18 | End: 2021-06-18

## 2021-06-18 RX ORDER — HYDROCODONE BITARTRATE AND ACETAMINOPHEN 5; 325 MG/1; MG/1
2 TABLET ORAL AS NEEDED
Status: DISCONTINUED | OUTPATIENT
Start: 2021-06-18 | End: 2021-06-18

## 2021-06-18 RX ORDER — SODIUM CHLORIDE, SODIUM LACTATE, POTASSIUM CHLORIDE, CALCIUM CHLORIDE 600; 310; 30; 20 MG/100ML; MG/100ML; MG/100ML; MG/100ML
INJECTION, SOLUTION INTRAVENOUS CONTINUOUS
Status: DISCONTINUED | OUTPATIENT
Start: 2021-06-18 | End: 2021-06-18

## 2021-06-18 RX ORDER — BUPIVACAINE HYDROCHLORIDE AND EPINEPHRINE 5; 5 MG/ML; UG/ML
INJECTION, SOLUTION EPIDURAL; INTRACAUDAL; PERINEURAL AS NEEDED
Status: DISCONTINUED | OUTPATIENT
Start: 2021-06-18 | End: 2021-06-18 | Stop reason: HOSPADM

## 2021-06-18 RX ORDER — GLYCOPYRROLATE 0.2 MG/ML
INJECTION, SOLUTION INTRAMUSCULAR; INTRAVENOUS AS NEEDED
Status: DISCONTINUED | OUTPATIENT
Start: 2021-06-18 | End: 2021-06-18 | Stop reason: SURG

## 2021-06-18 RX ORDER — HYDROCODONE BITARTRATE AND ACETAMINOPHEN 5; 325 MG/1; MG/1
1 TABLET ORAL AS NEEDED
Status: DISCONTINUED | OUTPATIENT
Start: 2021-06-18 | End: 2021-06-18

## 2021-06-18 RX ORDER — HYDROCODONE BITARTRATE AND ACETAMINOPHEN 5; 325 MG/1; MG/1
TABLET ORAL
Qty: 10 TABLET | Refills: 0 | Status: SHIPPED | OUTPATIENT
Start: 2021-06-18 | End: 2021-07-01

## 2021-06-18 RX ADMIN — LIDOCAINE HYDROCHLORIDE 50 MG: 10 INJECTION, SOLUTION EPIDURAL; INFILTRATION; INTRACAUDAL; PERINEURAL at 15:27:00

## 2021-06-18 RX ADMIN — DEXAMETHASONE SODIUM PHOSPHATE 4 MG: 4 MG/ML VIAL (ML) INJECTION at 15:49:00

## 2021-06-18 RX ADMIN — GLYCOPYRROLATE 0.2 MG: 0.2 INJECTION, SOLUTION INTRAMUSCULAR; INTRAVENOUS at 16:02:00

## 2021-06-18 RX ADMIN — SODIUM CHLORIDE, SODIUM LACTATE, POTASSIUM CHLORIDE, CALCIUM CHLORIDE: 600; 310; 30; 20 INJECTION, SOLUTION INTRAVENOUS at 16:12:00

## 2021-06-18 NOTE — ANESTHESIA POSTPROCEDURE EVALUATION
[facility] Patient Status:  Hospital Outpatient Surgery   Age/Gender 69year old female MRN [id number]   Location [facility] Attending Eder Saunders MD   Hosp Day # 0 PCP MD Param Santoyo

## 2021-06-18 NOTE — INTERVAL H&P NOTE
Pre-op Diagnosis: Grade IV hemorrhoids [K64.3]  Rectal bleeding [K62.5]    The above referenced H&P was reviewed by Helen Manjarrez MD on 6/18/2021, the patient was examined and no significant changes have occurred in the patient's condition since the H&

## 2021-06-18 NOTE — ANESTHESIA PREPROCEDURE EVALUATION
PRE-OP EVALUATION    Patient Name: Batool Quiroz    Admit Diagnosis: Grade IV hemorrhoids [K64.3]  Rectal bleeding [K62.5]    Pre-op Diagnosis: Grade IV hemorrhoids [K64.3]  Rectal bleeding [K62.5]    PROCEDURE FOR PROLAPSE AND HEMORRHOIDS PPH STAPLED HE mouth daily. , Disp: , Rfl: , Past Week at Unknown time  Glucosamine-Chondroit-Vit C-Mn (GLUCOSAMINE CHONDR 500 COMPLEX OR), Take 1 tablet by mouth daily.   , Disp: , Rfl: , Past Week at Unknown time        Allergies: Erythromycin      Anesthesia Evaluatio ASA: 2   Plan: general  NPO status verified and patient meets guidelines.           Plan/risks discussed with: patient                Present on Admission:  **None**

## 2021-06-18 NOTE — BRIEF OP NOTE
Pre-Operative Diagnosis: Grade IV hemorrhoids [K64.3]  Rectal bleeding [K62.5]     Post-Operative Diagnosis: Grade IV hemorrhoids [K64.3] Rectal bleeding [K62.5]      Procedure Performed:   PROCEDURE FOR PROLAPSE AND HEMORRHOIDS PPH STAPLED HEMORRHOIDECTOM

## 2021-06-18 NOTE — OPERATIVE REPORT
OPERATIVE REPORT   PREOPERATIVE DIAGNOSIS: Prolapsed hemorrhoids. POSTOPERATIVE DIAGNOSIS: Prolapsed hemorrhoids. PROCEDURE PERFORMED: PPH stapled hemorrhoidectomy. ASSISTANT:  Staff   ANESTHESIA: General endotracheal anesthesia.    ANESTHESIOLOGIST: provided with the [facility] HSPTL stapler was inserted into the anus and secured in place with 2-0 nylon suture. A circumferential suture of #1 Prolene was used to encircle the anus at a level 2-3 cm above the dentate line, incorporating all hemorrhoidal tissues.  This

## 2021-06-18 NOTE — ANESTHESIA PROCEDURE NOTES
Airway  Date/Time: 6/18/2021 3:47 PM  Urgency: elective    Airway not difficult    General Information and Staff    Patient location during procedure: OR  Anesthesiologist: Cherelle Pearl MD  Performed: anesthesiologist     Indications and Patient

## 2021-06-19 RX ORDER — ONDANSETRON 4 MG/1
4 TABLET, ORALLY DISINTEGRATING ORAL EVERY 6 HOURS PRN
Qty: 20 TABLET | Refills: 0 | Status: SHIPPED | OUTPATIENT
Start: 2021-06-19 | End: 2021-09-08 | Stop reason: ALTCHOICE

## 2021-06-23 ENCOUNTER — TELEPHONE (OUTPATIENT)
Dept: SURGERY | Facility: CLINIC | Age: 71
End: 2021-06-23

## 2021-06-23 NOTE — TELEPHONE ENCOUNTER
Pt and  called regarding BMs. States feeling the need for a BM but nothing drops into the toilet, only comes out when wiping. Pt denies abd pain, able to pass gas. Pt is taking stool softener already.  Pt advised to take a dose of milk of magnesia an

## 2021-07-01 ENCOUNTER — OFFICE VISIT (OUTPATIENT)
Dept: SURGERY | Facility: CLINIC | Age: 71
End: 2021-07-01

## 2021-07-01 VITALS
BODY MASS INDEX: 32.1 KG/M2 | HEART RATE: 82 BPM | HEIGHT: 64 IN | TEMPERATURE: 98 F | WEIGHT: 188 LBS | DIASTOLIC BLOOD PRESSURE: 93 MMHG | SYSTOLIC BLOOD PRESSURE: 150 MMHG

## 2021-07-01 DIAGNOSIS — K64.3 GRADE IV HEMORRHOIDS: Primary | ICD-10-CM

## 2021-07-01 DIAGNOSIS — K62.5 RECTAL BLEEDING: ICD-10-CM

## 2021-07-01 PROCEDURE — 99024 POSTOP FOLLOW-UP VISIT: CPT | Performed by: PHYSICIAN ASSISTANT

## 2021-07-01 PROCEDURE — 3008F BODY MASS INDEX DOCD: CPT | Performed by: PHYSICIAN ASSISTANT

## 2021-07-01 PROCEDURE — 3080F DIAST BP >= 90 MM HG: CPT | Performed by: PHYSICIAN ASSISTANT

## 2021-07-01 PROCEDURE — 3077F SYST BP >= 140 MM HG: CPT | Performed by: PHYSICIAN ASSISTANT

## 2021-07-01 NOTE — PROGRESS NOTES
Post Operative Visit Note       Active Problems  1. Grade IV hemorrhoids    2. Rectal bleeding         Chief Complaint   Patient presents with:  Post-Op: p/o PPH stapled hemorrhoidectomy 6/18 Dr. Anju Hernández. c/o rectal pain 4/10. Using colace and benefiber. glasses   • Weight gain      Past Surgical History:   Procedure Laterality Date   • APPENDECTOMY     •      • CARPAL TUNNEL RELEASE  2016    bilateral    • CHOLECYSTECTOMY     • COLONOSCOPY     • COLONOSCOPY N/A 2/10/2021    Procedure: COL ondansetron (ZOFRAN ODT) 4 MG Oral Tablet Dispersible, Take 1 tablet (4 mg total) by mouth every 6 (six) hours as needed for Nausea., Disp: 20 tablet, Rfl: 0  •  docusate sodium (COLACE) 100 MG Oral Cap, Take 1 capsule (100 mg total) by mouth 3 (three) luis manuel for difficulty urinating, dysuria, frequency and urgency. Musculoskeletal: Negative for arthralgias and myalgias. Skin: Negative for color change and rash. Neurological: Negative for tremors, syncope and weakness.    Hematological: Negative for adenop her that continuing on Colace is perfectly acceptable. She may continue on this medication as needed postoperatively to prevent difficult bowel movements. She should also continue taking Benefiber daily.     I have no further follow-up scheduled with this

## 2021-07-21 ENCOUNTER — HOSPITAL ENCOUNTER (OUTPATIENT)
Dept: MAMMOGRAPHY | Age: 71
Discharge: HOME | End: 2021-07-21
Attending: INTERNAL MEDICINE
Payer: MEDICARE

## 2021-07-21 DIAGNOSIS — Z12.31 ENCOUNTER FOR SCREENING MAMMOGRAM FOR BREAST CANCER: ICD-10-CM

## 2021-07-21 PROCEDURE — 77063 BREAST TOMOSYNTHESIS BI: CPT | Performed by: INTERNAL MEDICINE

## 2021-07-21 PROCEDURE — 77067 SCR MAMMO BI INCL CAD: CPT | Performed by: INTERNAL MEDICINE

## 2021-08-04 DIAGNOSIS — F41.9 ANXIETY: ICD-10-CM

## 2021-08-07 RX ORDER — SERTRALINE HYDROCHLORIDE 100 MG/1
TABLET, FILM COATED ORAL
Qty: 90 TABLET | Refills: 3 | Status: SHIPPED | OUTPATIENT
Start: 2021-08-07

## 2021-09-08 ENCOUNTER — OFFICE VISIT (OUTPATIENT)
Dept: INTERNAL MEDICINE CLINIC | Facility: CLINIC | Age: 71
End: 2021-09-08
Payer: MEDICARE

## 2021-09-08 VITALS
OXYGEN SATURATION: 98 % | TEMPERATURE: 99 F | HEART RATE: 74 BPM | HEIGHT: 64 IN | BODY MASS INDEX: 33.23 KG/M2 | SYSTOLIC BLOOD PRESSURE: 120 MMHG | RESPIRATION RATE: 16 BRPM | WEIGHT: 194.63 LBS | DIASTOLIC BLOOD PRESSURE: 70 MMHG

## 2021-09-08 DIAGNOSIS — G89.29 CHRONIC RIGHT-SIDED LOW BACK PAIN WITH BILATERAL SCIATICA: ICD-10-CM

## 2021-09-08 DIAGNOSIS — M54.41 CHRONIC RIGHT-SIDED LOW BACK PAIN WITH BILATERAL SCIATICA: ICD-10-CM

## 2021-09-08 DIAGNOSIS — M17.12 PRIMARY OSTEOARTHRITIS OF LEFT KNEE: Primary | ICD-10-CM

## 2021-09-08 DIAGNOSIS — M54.42 CHRONIC RIGHT-SIDED LOW BACK PAIN WITH BILATERAL SCIATICA: ICD-10-CM

## 2021-09-08 PROCEDURE — 3078F DIAST BP <80 MM HG: CPT | Performed by: INTERNAL MEDICINE

## 2021-09-08 PROCEDURE — 3074F SYST BP LT 130 MM HG: CPT | Performed by: INTERNAL MEDICINE

## 2021-09-08 PROCEDURE — 99213 OFFICE O/P EST LOW 20 MIN: CPT | Performed by: INTERNAL MEDICINE

## 2021-09-08 PROCEDURE — 3008F BODY MASS INDEX DOCD: CPT | Performed by: INTERNAL MEDICINE

## 2021-09-08 RX ORDER — IBUPROFEN 200 MG
400 TABLET ORAL EVERY 6 HOURS PRN
COMMUNITY

## 2021-09-08 NOTE — PROGRESS NOTES
[facility] Internal Medicine Office Note  Chief Complaint:   Patient presents with:  Back Pain      HPI:   This is a 69year old female coming in for back and knee pain   HPI    She woke up with R knee and pain traveling down to her toes.  Burning unknown   • Heart Attack Paternal Grandfather          at 71   • Diabetes Maternal Grandmother         unknown   • Stroke Maternal Grandfather         several strokes   • Mental Disorder Brother         42's        I reviewed her's Past Medical Histo Glucosamine-Chondroit-Vit C-Mn (GLUCOSAMINE CHONDR 500 COMPLEX OR) Take 1 tablet by mouth daily. REVIEW OF SYSTEMS:   Review of Systems   Constitutional: Negative for fever. HENT: Negative for congestion.     Eyes: Negative for visual disturba all orders for this visit:    Primary osteoarthritis of left knee - can f/u ortho/Dr. Roxy Wall as her previous surgeon is retiring   -     ORTHOPEDIC - INTERNAL    Chronic right-sided low back pain with bilateral sciatica - start home physical therapy exer

## 2021-09-27 ENCOUNTER — TELEPHONE (OUTPATIENT)
Dept: PHYSICAL THERAPY | Facility: HOSPITAL | Age: 71
End: 2021-09-27

## 2021-09-27 ENCOUNTER — OFFICE VISIT (OUTPATIENT)
Dept: SURGERY | Facility: CLINIC | Age: 71
End: 2021-09-27
Payer: MEDICARE

## 2021-09-27 VITALS
WEIGHT: 190 LBS | DIASTOLIC BLOOD PRESSURE: 77 MMHG | BODY MASS INDEX: 32.44 KG/M2 | HEIGHT: 64 IN | SYSTOLIC BLOOD PRESSURE: 125 MMHG | HEART RATE: 85 BPM

## 2021-09-27 DIAGNOSIS — M17.0 PRIMARY OSTEOARTHRITIS OF BOTH KNEES: Primary | ICD-10-CM

## 2021-09-27 DIAGNOSIS — M54.42 CHRONIC BILATERAL LOW BACK PAIN WITH BILATERAL SCIATICA: ICD-10-CM

## 2021-09-27 DIAGNOSIS — G89.29 CHRONIC BILATERAL LOW BACK PAIN WITH BILATERAL SCIATICA: ICD-10-CM

## 2021-09-27 DIAGNOSIS — M54.41 CHRONIC BILATERAL LOW BACK PAIN WITH BILATERAL SCIATICA: ICD-10-CM

## 2021-09-27 PROCEDURE — 3074F SYST BP LT 130 MM HG: CPT | Performed by: PHYSICIAN ASSISTANT

## 2021-09-27 PROCEDURE — 99213 OFFICE O/P EST LOW 20 MIN: CPT | Performed by: PHYSICIAN ASSISTANT

## 2021-09-27 PROCEDURE — 3078F DIAST BP <80 MM HG: CPT | Performed by: PHYSICIAN ASSISTANT

## 2021-09-27 PROCEDURE — 3008F BODY MASS INDEX DOCD: CPT | Performed by: PHYSICIAN ASSISTANT

## 2021-09-27 NOTE — H&P
Neurosurgery Clinic Visit  2021    Kuldip Koch PCP:  Naveen Irwin MD    10/21/1950 MRN [id number]       CC:  Leg Pain    HPI:    Lashawn Grimaldo is a very pleasant 69year old female who presents for evaluation of back and LE pain.   She has had intermi History      Marital status:     Tobacco Use      Smoking status: Former Smoker        Packs/day: 1.00        Years: 15.00        Pack years: 13        Quit date: 1982        Years since quittin.7      Smokeless tobacco: Never Used    Vapin N    Thoracic Paraspinals N N    Lumbar Paraspinals + +    PSIS N N    SIJ + N    Trochanteric Bursa N N    Upper extremity strength:      Deltoid Biceps Triceps Wrist Extension  Finger Abduction Finger Extension Thumb Opposition   Right 5 5 5 5 5 5 5

## 2021-09-27 NOTE — PROGRESS NOTES
NP visit for chronic right side lower back pain    Bilateral sciatica pain more on the right side goes all the way to her foot come and goes     Feels like burning /tingling in both legs. ....       Pain 4/10 worst 9/10    X-ray in chart

## 2021-09-28 ENCOUNTER — OFFICE VISIT (OUTPATIENT)
Dept: PHYSICAL THERAPY | Age: 71
End: 2021-09-28
Attending: INTERNAL MEDICINE
Payer: MEDICARE

## 2021-09-28 DIAGNOSIS — G89.29 CHRONIC RIGHT-SIDED LOW BACK PAIN WITH BILATERAL SCIATICA: ICD-10-CM

## 2021-09-28 DIAGNOSIS — M54.42 CHRONIC RIGHT-SIDED LOW BACK PAIN WITH BILATERAL SCIATICA: ICD-10-CM

## 2021-09-28 DIAGNOSIS — M54.41 CHRONIC RIGHT-SIDED LOW BACK PAIN WITH BILATERAL SCIATICA: ICD-10-CM

## 2021-09-28 PROCEDURE — 97110 THERAPEUTIC EXERCISES: CPT

## 2021-09-28 PROCEDURE — 97162 PT EVAL MOD COMPLEX 30 MIN: CPT

## 2021-09-28 NOTE — PROGRESS NOTES
SPINE EVALUATION:   Referring Physician: Dr. Lurdes Alexis  Diagnosis: low back pain with ENOC sciatica; L knee OA Date of Service: 9/28/2021     PATIENT SUMMARY   Gloria Centeno is a 69year old female who presents to therapy today with complaints of worseing medical history was reviewed with Damien Deleon.  Significant findings include  has a past medical history of Anemia (1980), Anxiety, Arthritis, Back pain (2020), Feeling lonely (2020), Headache disorder (1992), Hearing loss, Heartburn (2015), Hemorrhoids, High ch None  OBJECTIVE:   Observation/Posture: flat thoracic and lumbar spine. Mild L trunk shift in stance    Lumbar AROM: (* denotes performed with pain)  Flexion: 110  Extension: 25*  Sidebending: R 75%;  L 75%* pain in R low back on angle from lower thoracic t changing pain levels.   PLAN OF CARE:    Goals: (to be met in 8 visits)   · Pt will report improved symptom centralization and absence of R or L shin burning or tingling with walking 1 block or 20 minutes around a grocery store  · Pt will have decreased par

## 2021-09-30 ENCOUNTER — OFFICE VISIT (OUTPATIENT)
Dept: PHYSICAL THERAPY | Age: 71
End: 2021-09-30
Attending: INTERNAL MEDICINE
Payer: MEDICARE

## 2021-09-30 PROCEDURE — 97110 THERAPEUTIC EXERCISES: CPT

## 2021-09-30 PROCEDURE — 97140 MANUAL THERAPY 1/> REGIONS: CPT

## 2021-09-30 NOTE — PROGRESS NOTES
Dx: low back pain with ENOC sciatica; L knee OA         Insurance (Authorized # of Visits):  Medicare           Authorizing Physician: Dr. Fermin Weems  Next MD visit: none scheduled  Fall Risk: standard         Precautions: n/a             Subjective: Felt reall x5 min                HEP: floor to ceiling stretch x5, side to side hip flexor and lumbar rotation stretch x3 deepika thorpe clams    Charges: therex x2, manual x1       Total Timed Treatment: 45 min  Total Treatment Time: 45 min

## 2021-10-04 ENCOUNTER — TELEPHONE (OUTPATIENT)
Dept: ORTHOPEDICS CLINIC | Facility: CLINIC | Age: 71
End: 2021-10-04

## 2021-10-04 DIAGNOSIS — M25.562 LEFT KNEE PAIN, UNSPECIFIED CHRONICITY: Primary | ICD-10-CM

## 2021-10-04 NOTE — TELEPHONE ENCOUNTER
Pt has an appt w/r Geronimo Rosa to discuss left  knee replacement. No imaging on file.  Please advice,thanks  Future Appointments   Date Time Provider La Nena Bernardo   10/5/2021  9:15 AM Jesus Lees PT [facility] PHYS YONI Watson   10/7/2021  9:15 AM Alexandre Hanks

## 2021-10-05 ENCOUNTER — OFFICE VISIT (OUTPATIENT)
Dept: PHYSICAL THERAPY | Age: 71
End: 2021-10-05
Attending: INTERNAL MEDICINE
Payer: MEDICARE

## 2021-10-05 PROCEDURE — 97110 THERAPEUTIC EXERCISES: CPT

## 2021-10-05 PROCEDURE — 97112 NEUROMUSCULAR REEDUCATION: CPT

## 2021-10-05 PROCEDURE — 97140 MANUAL THERAPY 1/> REGIONS: CPT

## 2021-10-05 NOTE — PROGRESS NOTES
Dx: low back pain with ENOC sciatica; L knee OA         Insurance (Authorized # of Visits):  Medicare           Authorizing Physician: Dr. Janene Mccray  Next MD visit: none scheduled  Fall Risk: standard         Precautions: n/a             Subjective:  Woke up to flossing x10 ea   Bridges 2x10  Clams 1x12 ea  3-way hip YTB   -ext 1x12 ea  -abd 1x10 ea  -flex 1x10 ea       Neuro Re-ed  Seated slump flossing with cervical flexion (tensioners) x10 R; 2x10 L Neuro Re-ed  Seated slump flossing with cervical flexion (ten Dapsone Pregnancy And Lactation Text: This medication is Pregnancy Category C and is not considered safe during pregnancy or breast feeding.

## 2021-10-07 ENCOUNTER — OFFICE VISIT (OUTPATIENT)
Dept: PHYSICAL THERAPY | Age: 71
End: 2021-10-07
Attending: INTERNAL MEDICINE
Payer: MEDICARE

## 2021-10-07 PROCEDURE — 97110 THERAPEUTIC EXERCISES: CPT

## 2021-10-07 PROCEDURE — 97112 NEUROMUSCULAR REEDUCATION: CPT

## 2021-10-07 NOTE — PROGRESS NOTES
Dx: low back pain with ENOC sciatica; L knee OA         Insurance (Authorized # of Visits):  Medicare           Authorizing Physician: Dr. Vic Schroeder  Next MD visit: none scheduled  Fall Risk: standard         Precautions: n/a             Subjective: Scout January rep ceiling stretch x5  Seated side to side stretch 2x20s ea  hooklying LTR x10  SKTC 2x15s ea  -with sciatic flossing x10 ea   Bridges 2x10  Clams 1x12 ea  3-way hip YTB   -ext 1x12 ea  -abd 1x10 ea  -flex 1x10 ea  Therex  NuStep -L4 x5 min   Seated floor to

## 2021-10-12 ENCOUNTER — HOSPITAL ENCOUNTER (OUTPATIENT)
Dept: BONE DENSITY | Age: 71
Discharge: HOME | End: 2021-10-12
Attending: INTERNAL MEDICINE
Payer: MEDICARE

## 2021-10-12 ENCOUNTER — OFFICE VISIT (OUTPATIENT)
Dept: PHYSICAL THERAPY | Age: 71
End: 2021-10-12
Attending: INTERNAL MEDICINE
Payer: MEDICARE

## 2021-10-12 DIAGNOSIS — M85.89 OSTEOPENIA OF MULTIPLE SITES: ICD-10-CM

## 2021-10-12 DIAGNOSIS — Z78.0 POSTMENOPAUSAL ESTROGEN DEFICIENCY: ICD-10-CM

## 2021-10-12 PROCEDURE — 97140 MANUAL THERAPY 1/> REGIONS: CPT

## 2021-10-12 PROCEDURE — 97110 THERAPEUTIC EXERCISES: CPT

## 2021-10-12 PROCEDURE — 77080 DXA BONE DENSITY AXIAL: CPT | Performed by: INTERNAL MEDICINE

## 2021-10-12 NOTE — PROGRESS NOTES
Dx: low back pain with ENOC sciatica; L knee OA         Insurance (Authorized # of Visits):  Medicare           Authorizing Physician: Dr. Jules Wade  Next MD visit: none scheduled  Fall Risk: standard         Precautions: n/a             Subjective: Carter Doran con Bridges 2x10  Clams 1x15 ea  3-way hip YTB   -ext 2x10 ea  -abd 1x10 ea  -flex 1x10 ea  Therex  NuStep -L4 x5 min   Seated floor to ceiling stretch x5  Seated side to side stretch 2x20s ea  hooklying LTR x10  SKTC 2x15s ea  -with sciatic flossing x10 ea [place] 3 way hip    Charges: therex x2, manual x1     Total Timed Treatment: 45 min  Total Treatment Time: 45 min

## 2021-10-14 ENCOUNTER — OFFICE VISIT (OUTPATIENT)
Dept: PHYSICAL THERAPY | Age: 71
End: 2021-10-14
Attending: INTERNAL MEDICINE
Payer: MEDICARE

## 2021-10-14 PROCEDURE — 97140 MANUAL THERAPY 1/> REGIONS: CPT

## 2021-10-14 PROCEDURE — 97110 THERAPEUTIC EXERCISES: CPT

## 2021-10-14 PROCEDURE — 97112 NEUROMUSCULAR REEDUCATION: CPT

## 2021-10-14 NOTE — PROGRESS NOTES
Dx: low back pain with ENOC sciatica; L knee OA         Insurance (Authorized # of Visits):  Medicare           Authorizing Physician: Dr. Vic Schroeder  Next MD visit: none scheduled  Fall Risk: standard         Precautions: n/a             Subjective: Scout January rep ea  hooklying LTR x10  SKTC 2x10s ea  -with sciatic flossing x10 ea   Bridges 2x10  Clams 1x15 ea  3-way hip YTB   -ext 2x10 ea  -abd 1x10 ea  -flex 1x10 ea  Therex  NuStep -L4 x5 min   Seated floor to ceiling stretch x5  Seated side to side stretch 2x20s x5 min   Manual Therapy  LAD L -held  Prone quad stretch -held  Prone PA L3 -held    STM thoracolumbar paraspinals and QL x5 min   Manual Therapy  LAD 3x30s ea  LTR gapping mobilizations Gr II-III x2 min ea  Prone quad stretch -held  Prone PA L3 -held    S

## 2021-10-19 ENCOUNTER — APPOINTMENT (OUTPATIENT)
Dept: PHYSICAL THERAPY | Age: 71
End: 2021-10-19
Attending: INTERNAL MEDICINE
Payer: MEDICARE

## 2021-10-19 DIAGNOSIS — F41.9 ANXIETY: ICD-10-CM

## 2021-10-20 RX ORDER — ALPRAZOLAM 0.25 MG/1
TABLET ORAL
Qty: 60 TABLET | Refills: 5 | Status: SHIPPED | OUTPATIENT
Start: 2021-10-20

## 2021-10-20 NOTE — TELEPHONE ENCOUNTER
No Protocol     Requesting: alprazolam 0.25mg     LOV:5/27/21   Anxiety - cont sertraline.  Discussed limiting alprazolam to see if this effects memory concerns   RTC: 6 months   Filled: 5/27/21 #60 3 refills   Recent Labs: 6/7/21     Upcoming OV: none sche

## 2021-10-21 ENCOUNTER — OFFICE VISIT (OUTPATIENT)
Dept: PHYSICAL THERAPY | Age: 71
End: 2021-10-21
Attending: INTERNAL MEDICINE
Payer: MEDICARE

## 2021-10-21 PROCEDURE — 97140 MANUAL THERAPY 1/> REGIONS: CPT

## 2021-10-21 PROCEDURE — 97112 NEUROMUSCULAR REEDUCATION: CPT

## 2021-10-21 PROCEDURE — 97110 THERAPEUTIC EXERCISES: CPT

## 2021-10-21 NOTE — PROGRESS NOTES
Progress/Discharge Summary  Pt has attended 7 visits in Physical Therapy.    Dx: low back pain with ENOC sciatica; L knee OA         Insurance (Authorized # of Visits):  Medicare           Authorizing Physician: Dr. Gerson Kan  Next MD visit: none scheduled  F washing dishes -progress  · Pt will demonstrate improved core strength to be able to perform sit<>stand transfers 5x without L LE buckling and with <3/10 back pain - part MET sit<>stands, still some LBP  · Pt will be independent and compliant with comprehe flossing with cervical flexion (tensioners) x10 R; 2x10 L Neuro Re-ed  Seated slump flossing with cervical flexion (tensioners) x10 ea  hooklying TA with   -BKFO 1x10  -March 2x8  -SLR -next visit   Tandem stance x20s ea (fair>good) Neuro Re-ed  Manual L s min  Total Treatment Time: 45 min

## 2021-11-08 ENCOUNTER — TELEPHONE (OUTPATIENT)
Dept: PHYSICAL THERAPY | Age: 71
End: 2021-11-08

## 2021-11-08 NOTE — TELEPHONE ENCOUNTER
Called to follow-up on HEP compliance and if symptoms are continuing to feel better with home exercises. No answer. Left message to call back if she has any questions or concerns. Otherwise, plant to D/C at this time.

## 2021-11-22 ENCOUNTER — OFFICE VISIT (OUTPATIENT)
Dept: ORTHOPEDICS CLINIC | Facility: CLINIC | Age: 71
End: 2021-11-22
Payer: MEDICARE

## 2021-11-22 ENCOUNTER — HOSPITAL ENCOUNTER (OUTPATIENT)
Dept: GENERAL RADIOLOGY | Age: 71
Discharge: HOME | End: 2021-11-22
Attending: ORTHOPAEDIC SURGERY
Payer: MEDICARE

## 2021-11-22 VITALS — HEIGHT: 64 IN | WEIGHT: 195.63 LBS | HEART RATE: 73 BPM | BODY MASS INDEX: 33.4 KG/M2 | OXYGEN SATURATION: 99 %

## 2021-11-22 DIAGNOSIS — M17.12 PRIMARY OSTEOARTHRITIS OF LEFT KNEE: Primary | ICD-10-CM

## 2021-11-22 DIAGNOSIS — M25.562 LEFT KNEE PAIN, UNSPECIFIED CHRONICITY: ICD-10-CM

## 2021-11-22 DIAGNOSIS — M17.12 PRIMARY OSTEOARTHRITIS OF LEFT KNEE: ICD-10-CM

## 2021-11-22 DIAGNOSIS — Z01.89 ENCOUNTER FOR LOWER EXTREMITY COMPARISON IMAGING STUDY: ICD-10-CM

## 2021-11-22 PROCEDURE — 73564 X-RAY EXAM KNEE 4 OR MORE: CPT | Performed by: ORTHOPAEDIC SURGERY

## 2021-11-22 PROCEDURE — 77073 BONE LENGTH STUDIES: CPT | Performed by: ORTHOPAEDIC SURGERY

## 2021-11-22 PROCEDURE — 3008F BODY MASS INDEX DOCD: CPT | Performed by: ORTHOPAEDIC SURGERY

## 2021-11-22 PROCEDURE — 99203 OFFICE O/P NEW LOW 30 MIN: CPT | Performed by: ORTHOPAEDIC SURGERY

## 2021-11-22 NOTE — PROGRESS NOTES
Surgery Scheduling Sheet for Total Knee Arthroplasty  Name: Scout Leiva  : 10/21/1950    Procedure: Left Total Knee Arthroplasty   Diagnosis: Left Knee Arthritis   CPT Code: 67061  Anesthesia: Choice  Length of Surgery: 2 hours  Instruments: Yuly Wade

## 2021-11-22 NOTE — H&P
[facility] New Patient Note    CC: Patient presents with: Other: discuss left knee replacement       HPI: This 66year old female presents today with complaints of left knee pain. Patient reports that this is been going on for years.   She has histo Procedure: COLONOSCOPY;  Surgeon: Surya Lu MD;  Location: [address] [place] [address] ENDOSCOPY   • CORRECT BUNION,OTHR METHODS Right    • HEMORRHOIDECTOMY  06/18/2021   • HEMORRHOIDECTOMY,INT/EXT,SIMPLE  2019   • HYSTERECTOMY  1990    age 35 complete hysterectomy Grandfather    • Hypertension Paternal Grandfather         unknown   • Heart Attack Paternal Grandfather          at 71   • Diabetes Maternal Grandmother         unknown   • Stroke Maternal Grandfather         several strokes   • Mental Disorder Brothe severe left knee valgus osteoarthritis    Plan: I discussed the etiology, natural history, and management options for symptomatic knee osteoarthritis.   I discussed nonsurgical and surgical treatments, with nonsurgical treatments to include anti-inflammator

## 2021-11-23 ENCOUNTER — TELEPHONE (OUTPATIENT)
Dept: ORTHOPEDICS CLINIC | Facility: CLINIC | Age: 71
End: 2021-11-23

## 2021-11-23 NOTE — TELEPHONE ENCOUNTER
Surgeon: Dr. Donato West    Date of Surgery: 4/19/22        Post Op Appt: 5/4/22     Facility: [facility]    Inpatient or Outpatient: Inpatient    Surgical Assistant: yes    Preadmission Testing Ordered:  yes    Pre-Op Clearance Requested:   PCP: yes   Cardiac: no   Pulmonary: no   Dental: no   Other: no    DME: n/a    Rehab Services Ordered:   Home Health: yes   Outpatient: no    Is this work comp related? no    Prior Authorization: pending    Disability Paperwork: no    On [place] Calendar: no    Notes:

## 2021-12-23 ENCOUNTER — ORDER TRANSCRIPTION (OUTPATIENT)
Dept: PHYSICAL THERAPY | Facility: HOSPITAL | Age: 71
End: 2021-12-23

## 2021-12-23 DIAGNOSIS — M17.12 ARTHRITIS OF LEFT KNEE: Primary | ICD-10-CM

## 2021-12-27 ENCOUNTER — TELEPHONE (OUTPATIENT)
Dept: PHYSICAL THERAPY | Facility: HOSPITAL | Age: 71
End: 2021-12-27

## 2021-12-28 ENCOUNTER — LAB ENCOUNTER (OUTPATIENT)
Dept: LAB | Age: 71
End: 2021-12-28
Attending: INTERNAL MEDICINE
Payer: MEDICARE

## 2021-12-28 ENCOUNTER — OFFICE VISIT (OUTPATIENT)
Dept: INTERNAL MEDICINE CLINIC | Facility: CLINIC | Age: 71
End: 2021-12-28
Payer: MEDICARE

## 2021-12-28 ENCOUNTER — EKG ENCOUNTER (OUTPATIENT)
Dept: LAB | Age: 71
End: 2021-12-28
Attending: INTERNAL MEDICINE
Payer: MEDICARE

## 2021-12-28 VITALS
DIASTOLIC BLOOD PRESSURE: 82 MMHG | TEMPERATURE: 99 F | HEART RATE: 76 BPM | BODY MASS INDEX: 34.23 KG/M2 | WEIGHT: 193.19 LBS | OXYGEN SATURATION: 98 % | RESPIRATION RATE: 16 BRPM | SYSTOLIC BLOOD PRESSURE: 144 MMHG | HEIGHT: 63 IN

## 2021-12-28 DIAGNOSIS — M13.0 ARTHRITIS OF MULTIPLE SITES: ICD-10-CM

## 2021-12-28 DIAGNOSIS — M17.12 PRIMARY OSTEOARTHRITIS OF LEFT KNEE: Primary | ICD-10-CM

## 2021-12-28 DIAGNOSIS — M17.12 PRIMARY OSTEOARTHRITIS OF LEFT KNEE: ICD-10-CM

## 2021-12-28 DIAGNOSIS — Z01.818 PRE-OP EVALUATION: ICD-10-CM

## 2021-12-28 PROCEDURE — 80053 COMPREHEN METABOLIC PANEL: CPT

## 2021-12-28 PROCEDURE — 85730 THROMBOPLASTIN TIME PARTIAL: CPT

## 2021-12-28 PROCEDURE — 93010 ELECTROCARDIOGRAM REPORT: CPT | Performed by: INTERNAL MEDICINE

## 2021-12-28 PROCEDURE — 3079F DIAST BP 80-89 MM HG: CPT | Performed by: INTERNAL MEDICINE

## 2021-12-28 PROCEDURE — 86431 RHEUMATOID FACTOR QUANT: CPT

## 2021-12-28 PROCEDURE — 85610 PROTHROMBIN TIME: CPT

## 2021-12-28 PROCEDURE — 93005 ELECTROCARDIOGRAM TRACING: CPT

## 2021-12-28 PROCEDURE — 3008F BODY MASS INDEX DOCD: CPT | Performed by: INTERNAL MEDICINE

## 2021-12-28 PROCEDURE — 99214 OFFICE O/P EST MOD 30 MIN: CPT | Performed by: INTERNAL MEDICINE

## 2021-12-28 PROCEDURE — 3077F SYST BP >= 140 MM HG: CPT | Performed by: INTERNAL MEDICINE

## 2021-12-28 PROCEDURE — 36415 COLL VENOUS BLD VENIPUNCTURE: CPT

## 2021-12-28 PROCEDURE — 85025 COMPLETE CBC W/AUTO DIFF WBC: CPT

## 2021-12-28 NOTE — PATIENT INSTRUCTIONS
Avoid NSAIDs and vitamins for one week prior to surgery. Check blood pressure daily and record. Let us know if blood pressure is persistently above 130s/80s.

## 2021-12-28 NOTE — PROGRESS NOTES
Leonid Coronado is a 66year old female who presents for a pre-operative physical exam.   HPI related to surgery:   Leonid Coronado is scheduled for   Left total knee replaced procedure    on 1/25/21  to be performed by Dr Melissa Rollins.      Worsening knee pain COLONOSCOPY;  Surgeon: Jennie Marquez MD;  Location: [facility] ENDOSCOPY   • CORRECT BUNION,OTHR METHODS Right    • HEMORRHOIDECTOMY  06/18/2021   • HEMORRHOIDECTOMY,INT/EXT,SIMPLE  2019   • HYSTERECTOMY  1990    age 35 complete hysterectomy    • OOPHORECT Acetaminophen  MG Oral Tab CR every 8 (eight) hours as needed. • Calcium Carb-Cholecalciferol (CALCIUM 1000 + D) 1000-800 MG-UNIT Oral Tab Take 1 tablet by mouth daily. • Melatonin 5 MG Oral Chew Tab nightly.        • EZETIMIBE-SIMVASTATIN 10- No wheezes. No rhonchi. Cardiovascular: S1, S2.  Regular rate and rhythm. No murmurs. Equal pulses   Abdomen: Normal bowel sounds. Soft, nontender, nondistended. No rebound tenderness  Neurologic: Alert and oriented x 3. No focal neurological deficits. insulin:no  Pre-operative creatinine >2mg/dL: no   Total points: 0   reported risk of composite outcome of myocardial infarction, cardiac arrest, or death 30 days after noncardiac surgery based on RCRI points(2)   3.9% (95% CI 2.8%-5.4%) for 0 points   6%

## 2021-12-30 DIAGNOSIS — Z01.810 PREOP CARDIOVASCULAR EXAM: ICD-10-CM

## 2021-12-30 DIAGNOSIS — R79.1 ELEVATED PARTIAL THROMBOPLASTIN TIME (PTT): ICD-10-CM

## 2021-12-30 DIAGNOSIS — R94.31 T WAVE INVERSION IN EKG: Primary | ICD-10-CM

## 2022-01-01 NOTE — TELEPHONE ENCOUNTER
Ran insurance on 1.1.22 and it not active, sent [employer] message to patient to upload new insurance

## 2022-01-10 NOTE — TELEPHONE ENCOUNTER
Per Juju at [facility] - no co morbities to authorize an IP - patient is approved for 23[facility] CPT CODE [address] [place] [id number]

## 2022-01-11 ENCOUNTER — HOSPITAL ENCOUNTER (OUTPATIENT)
Dept: CV DIAGNOSTICS | Age: 72
Discharge: HOME | End: 2022-01-11
Attending: INTERNAL MEDICINE
Payer: MEDICARE

## 2022-01-11 DIAGNOSIS — R94.31 T WAVE INVERSION IN EKG: ICD-10-CM

## 2022-01-11 DIAGNOSIS — Z01.810 PREOP CARDIOVASCULAR EXAM: ICD-10-CM

## 2022-01-11 PROCEDURE — 93306 TTE W/DOPPLER COMPLETE: CPT | Performed by: INTERNAL MEDICINE

## 2022-01-12 ENCOUNTER — LABORATORY ENCOUNTER (OUTPATIENT)
Dept: LAB | Age: 72
End: 2022-01-12
Attending: ORTHOPAEDIC SURGERY
Payer: MEDICARE

## 2022-01-12 DIAGNOSIS — M17.0 PRIMARY OSTEOARTHRITIS OF BOTH KNEES: ICD-10-CM

## 2022-01-12 DIAGNOSIS — R79.1 ELEVATED PARTIAL THROMBOPLASTIN TIME (PTT): ICD-10-CM

## 2022-01-12 LAB
ANTIBODY SCREEN: NEGATIVE
APTT PPP: 29.9 SECONDS (ref 23.3–35.6)
RH BLOOD TYPE: POSITIVE

## 2022-01-12 PROCEDURE — 86850 RBC ANTIBODY SCREEN: CPT

## 2022-01-12 PROCEDURE — 85730 THROMBOPLASTIN TIME PARTIAL: CPT

## 2022-01-12 PROCEDURE — 86900 BLOOD TYPING SEROLOGIC ABO: CPT

## 2022-01-12 PROCEDURE — 86901 BLOOD TYPING SEROLOGIC RH(D): CPT

## 2022-01-12 PROCEDURE — 36415 COLL VENOUS BLD VENIPUNCTURE: CPT

## 2022-01-14 ENCOUNTER — TELEPHONE (OUTPATIENT)
Dept: ORTHOPEDICS CLINIC | Facility: CLINIC | Age: 72
End: 2022-01-14

## 2022-01-14 NOTE — TELEPHONE ENCOUNTER
Spoke to patient and  notified the surgery is approved at the 23 obv due to no commorbities, patient and  verbalized understanding

## 2022-01-14 NOTE — TELEPHONE ENCOUNTER
Patient's  called because wife is supposed to have surgery but got a letter from [facility] only partial is approved. Please would like to be contacted regarding this matter. Thanks.     Patient's  can be reached at [phone]

## 2022-01-18 ENCOUNTER — LABORATORY ENCOUNTER (OUTPATIENT)
Dept: LAB | Age: 72
End: 2022-01-18
Attending: ORTHOPAEDIC SURGERY
Payer: MEDICARE

## 2022-01-18 DIAGNOSIS — M17.0 PRIMARY OSTEOARTHRITIS OF BOTH KNEES: ICD-10-CM

## 2022-01-18 PROCEDURE — 87081 CULTURE SCREEN ONLY: CPT

## 2022-01-26 ENCOUNTER — TELEPHONE (OUTPATIENT)
Dept: PHYSICAL THERAPY | Facility: HOSPITAL | Age: 72
End: 2022-01-26

## 2022-02-08 ENCOUNTER — APPOINTMENT (OUTPATIENT)
Dept: PHYSICAL THERAPY | Age: 72
End: 2022-02-08
Attending: ORTHOPAEDIC SURGERY
Payer: MEDICARE

## 2022-02-10 ENCOUNTER — APPOINTMENT (OUTPATIENT)
Dept: PHYSICAL THERAPY | Age: 72
End: 2022-02-10
Attending: ORTHOPAEDIC SURGERY
Payer: MEDICARE

## 2022-02-15 ENCOUNTER — APPOINTMENT (OUTPATIENT)
Dept: PHYSICAL THERAPY | Age: 72
End: 2022-02-15
Attending: ORTHOPAEDIC SURGERY
Payer: MEDICARE

## 2022-02-17 ENCOUNTER — APPOINTMENT (OUTPATIENT)
Dept: PHYSICAL THERAPY | Age: 72
End: 2022-02-17
Attending: ORTHOPAEDIC SURGERY
Payer: MEDICARE

## 2022-02-18 ENCOUNTER — TELEPHONE (OUTPATIENT)
Dept: INTERNAL MEDICINE CLINIC | Facility: CLINIC | Age: 72
End: 2022-02-18

## 2022-02-18 NOTE — TELEPHONE ENCOUNTER
lmtcb x1   Pt needs pre-op clearance. Dr. Jarod Chatterjee 06   4/19/2022  LEFT TOTAL KNEE ARTHROPLASTY    Per Dr. Doni Kraus request complete 2-3 weeks prior to surgery to avoid any conflicts with surgery. Please attempt to schedule again for surgical clearance. Thank you!

## 2022-02-21 NOTE — TELEPHONE ENCOUNTER
Future Appointments   Date Time Provider La Nena Bernardo   3/28/2022  1:30 PM Sunday Lutz MD [facility]   5/4/2022 10:40 AM Ivanna Campbell MD [facility]

## 2022-02-22 ENCOUNTER — APPOINTMENT (OUTPATIENT)
Dept: PHYSICAL THERAPY | Age: 72
End: 2022-02-22
Attending: ORTHOPAEDIC SURGERY
Payer: MEDICARE

## 2022-02-24 ENCOUNTER — APPOINTMENT (OUTPATIENT)
Dept: PHYSICAL THERAPY | Age: 72
End: 2022-02-24
Attending: ORTHOPAEDIC SURGERY
Payer: MEDICARE

## 2022-03-01 ENCOUNTER — APPOINTMENT (OUTPATIENT)
Dept: PHYSICAL THERAPY | Age: 72
End: 2022-03-01
Attending: ORTHOPAEDIC SURGERY
Payer: MEDICARE

## 2022-03-03 ENCOUNTER — APPOINTMENT (OUTPATIENT)
Dept: PHYSICAL THERAPY | Age: 72
End: 2022-03-03
Attending: ORTHOPAEDIC SURGERY
Payer: MEDICARE

## 2022-03-08 ENCOUNTER — APPOINTMENT (OUTPATIENT)
Dept: PHYSICAL THERAPY | Age: 72
End: 2022-03-08
Attending: ORTHOPAEDIC SURGERY
Payer: MEDICARE

## 2022-03-10 ENCOUNTER — APPOINTMENT (OUTPATIENT)
Dept: PHYSICAL THERAPY | Age: 72
End: 2022-03-10
Attending: ORTHOPAEDIC SURGERY
Payer: MEDICARE

## 2022-03-14 ENCOUNTER — TELEMEDICINE (OUTPATIENT)
Dept: INTERNAL MEDICINE CLINIC | Facility: CLINIC | Age: 72
End: 2022-03-14
Payer: MEDICARE

## 2022-03-14 ENCOUNTER — TELEPHONE (OUTPATIENT)
Dept: INTERNAL MEDICINE CLINIC | Facility: CLINIC | Age: 72
End: 2022-03-14

## 2022-03-14 DIAGNOSIS — J32.9 SINUSITIS, UNSPECIFIED CHRONICITY, UNSPECIFIED LOCATION: Primary | ICD-10-CM

## 2022-03-14 PROCEDURE — 99213 OFFICE O/P EST LOW 20 MIN: CPT | Performed by: INTERNAL MEDICINE

## 2022-03-14 RX ORDER — AZITHROMYCIN 250 MG/1
TABLET, FILM COATED ORAL
Qty: 6 TABLET | Refills: 0 | Status: SHIPPED | OUTPATIENT
Start: 2022-03-14 | End: 2022-03-19

## 2022-03-14 RX ORDER — AMOXICILLIN AND CLAVULANATE POTASSIUM 875; 125 MG/1; MG/1
1 TABLET, FILM COATED ORAL 2 TIMES DAILY
Qty: 14 TABLET | Refills: 0 | Status: SHIPPED | OUTPATIENT
Start: 2022-03-14 | End: 2022-03-14

## 2022-03-14 NOTE — TELEPHONE ENCOUNTER
Pt calling because she said that amoxicillin clavulanate 875-125 MG Oral Tab, makes her sick to her stomach and is wondering if Dr. Alisia Deal can prescribe something else. Pt was seen today via video visit.

## 2022-03-15 ENCOUNTER — APPOINTMENT (OUTPATIENT)
Dept: PHYSICAL THERAPY | Age: 72
End: 2022-03-15
Attending: ORTHOPAEDIC SURGERY
Payer: MEDICARE

## 2022-03-17 ENCOUNTER — APPOINTMENT (OUTPATIENT)
Dept: PHYSICAL THERAPY | Age: 72
End: 2022-03-17
Attending: ORTHOPAEDIC SURGERY
Payer: MEDICARE

## 2022-03-18 NOTE — TELEPHONE ENCOUNTER
Change in DOS and from IP to [facility] #[id number] for CPT CODE 40779

## 2022-03-22 RX ORDER — EZETIMIBE AND SIMVASTATIN 10; 40 MG/1; MG/1
1 TABLET ORAL NIGHTLY
Qty: 90 TABLET | Refills: 0 | Status: SHIPPED | OUTPATIENT
Start: 2022-03-22

## 2022-03-22 NOTE — TELEPHONE ENCOUNTER
LOV: 3/14/2022 with Dr. Marisol Yen   RTC: no follow-up on file  Last Relevant Labs: December 2021/January 2022  Filled: 4/2/2021    #90 with 3 refills    Future Appointments   Date Time Provider La Nena Tova   3/28/2022  1:30 PM Evelio Johnson MD [facility] [place]   5/4/2022 10:40 AM Rianna Louise MD [facility]

## 2022-03-28 ENCOUNTER — OFFICE VISIT (OUTPATIENT)
Dept: INTERNAL MEDICINE CLINIC | Facility: CLINIC | Age: 72
End: 2022-03-28
Payer: MEDICARE

## 2022-03-28 VITALS
OXYGEN SATURATION: 98 % | RESPIRATION RATE: 16 BRPM | HEART RATE: 69 BPM | DIASTOLIC BLOOD PRESSURE: 80 MMHG | WEIGHT: 192.81 LBS | HEIGHT: 63 IN | BODY MASS INDEX: 34.16 KG/M2 | SYSTOLIC BLOOD PRESSURE: 138 MMHG | TEMPERATURE: 98 F

## 2022-03-28 DIAGNOSIS — Z01.810 PREOPERATIVE CARDIOVASCULAR EXAMINATION: Primary | ICD-10-CM

## 2022-03-28 DIAGNOSIS — E78.00 PURE HYPERCHOLESTEROLEMIA: ICD-10-CM

## 2022-03-28 DIAGNOSIS — M17.12 PRIMARY OSTEOARTHRITIS OF LEFT KNEE: ICD-10-CM

## 2022-03-28 PROCEDURE — 99214 OFFICE O/P EST MOD 30 MIN: CPT | Performed by: INTERNAL MEDICINE

## 2022-03-28 PROCEDURE — 3079F DIAST BP 80-89 MM HG: CPT | Performed by: INTERNAL MEDICINE

## 2022-03-28 PROCEDURE — 3075F SYST BP GE 130 - 139MM HG: CPT | Performed by: INTERNAL MEDICINE

## 2022-03-28 PROCEDURE — 3008F BODY MASS INDEX DOCD: CPT | Performed by: INTERNAL MEDICINE

## 2022-03-31 ENCOUNTER — LAB ENCOUNTER (OUTPATIENT)
Dept: LAB | Age: 72
End: 2022-03-31
Attending: INTERNAL MEDICINE
Payer: MEDICARE

## 2022-03-31 DIAGNOSIS — M17.12 PRIMARY OSTEOARTHRITIS OF LEFT KNEE: ICD-10-CM

## 2022-03-31 DIAGNOSIS — Z01.810 PREOPERATIVE CARDIOVASCULAR EXAMINATION: ICD-10-CM

## 2022-03-31 DIAGNOSIS — M17.0 PRIMARY OSTEOARTHRITIS OF BOTH KNEES: ICD-10-CM

## 2022-03-31 LAB
ALBUMIN SERPL-MCNC: 4.2 G/DL (ref 3.4–5)
ALBUMIN/GLOB SERPL: 1.6 {RATIO} (ref 1–2)
ALP LIVER SERPL-CCNC: 68 U/L
ALT SERPL-CCNC: 29 U/L
ANION GAP SERPL CALC-SCNC: 6 MMOL/L (ref 0–18)
ANTIBODY SCREEN: NEGATIVE
APTT PPP: 36.4 SECONDS (ref 23.3–35.6)
AST SERPL-CCNC: 24 U/L (ref 15–37)
BASOPHILS # BLD AUTO: 0.04 X10(3) UL (ref 0–0.2)
BASOPHILS NFR BLD AUTO: 0.5 %
BILIRUB SERPL-MCNC: 0.5 MG/DL (ref 0.1–2)
BUN BLD-MCNC: 15 MG/DL (ref 7–18)
CALCIUM BLD-MCNC: 9.5 MG/DL (ref 8.5–10.1)
CHLORIDE SERPL-SCNC: 108 MMOL/L (ref 98–112)
CO2 SERPL-SCNC: 27 MMOL/L (ref 21–32)
CREAT BLD-MCNC: 0.83 MG/DL
EOSINOPHIL # BLD AUTO: 0.21 X10(3) UL (ref 0–0.7)
EOSINOPHIL NFR BLD AUTO: 2.7 %
ERYTHROCYTE [DISTWIDTH] IN BLOOD BY AUTOMATED COUNT: 12.5 %
FASTING STATUS PATIENT QL REPORTED: YES
GLOBULIN PLAS-MCNC: 2.7 G/DL (ref 2.8–4.4)
GLUCOSE BLD-MCNC: 135 MG/DL (ref 70–99)
HCT VFR BLD AUTO: 44.5 %
HGB BLD-MCNC: 14.4 G/DL
IMM GRANULOCYTES # BLD AUTO: 0.03 X10(3) UL (ref 0–1)
IMM GRANULOCYTES NFR BLD: 0.4 %
INR BLD: 0.97 (ref 0.8–1.2)
LYMPHOCYTES # BLD AUTO: 2.18 X10(3) UL (ref 1–4)
LYMPHOCYTES NFR BLD AUTO: 27.8 %
MCH RBC QN AUTO: 30.1 PG (ref 26–34)
MCHC RBC AUTO-ENTMCNC: 32.4 G/DL (ref 31–37)
MCV RBC AUTO: 92.9 FL
MONOCYTES # BLD AUTO: 0.58 X10(3) UL (ref 0.1–1)
MONOCYTES NFR BLD AUTO: 7.4 %
NEUTROPHILS # BLD AUTO: 4.79 X10 (3) UL (ref 1.5–7.7)
NEUTROPHILS # BLD AUTO: 4.79 X10(3) UL (ref 1.5–7.7)
NEUTROPHILS NFR BLD AUTO: 61.2 %
OSMOLALITY SERPL CALC.SUM OF ELEC: 295 MOSM/KG (ref 275–295)
PLATELET # BLD AUTO: 262 10(3)UL (ref 150–450)
POTASSIUM SERPL-SCNC: 4.2 MMOL/L (ref 3.5–5.1)
PROT SERPL-MCNC: 6.9 G/DL (ref 6.4–8.2)
PROTHROMBIN TIME: 12.9 SECONDS (ref 11.6–14.8)
RBC # BLD AUTO: 4.79 X10(6)UL
RH BLOOD TYPE: POSITIVE
SODIUM SERPL-SCNC: 141 MMOL/L (ref 136–145)
WBC # BLD AUTO: 7.8 X10(3) UL (ref 4–11)

## 2022-03-31 PROCEDURE — 85730 THROMBOPLASTIN TIME PARTIAL: CPT

## 2022-03-31 PROCEDURE — 86900 BLOOD TYPING SEROLOGIC ABO: CPT

## 2022-03-31 PROCEDURE — 85025 COMPLETE CBC W/AUTO DIFF WBC: CPT

## 2022-03-31 PROCEDURE — 87081 CULTURE SCREEN ONLY: CPT

## 2022-03-31 PROCEDURE — 85610 PROTHROMBIN TIME: CPT

## 2022-03-31 PROCEDURE — 86901 BLOOD TYPING SEROLOGIC RH(D): CPT

## 2022-03-31 PROCEDURE — 80053 COMPREHEN METABOLIC PANEL: CPT

## 2022-03-31 PROCEDURE — 36415 COLL VENOUS BLD VENIPUNCTURE: CPT

## 2022-03-31 PROCEDURE — 86850 RBC ANTIBODY SCREEN: CPT

## 2022-04-04 ENCOUNTER — TELEPHONE (OUTPATIENT)
Dept: INTERNAL MEDICINE CLINIC | Facility: CLINIC | Age: 72
End: 2022-04-04

## 2022-04-04 NOTE — TELEPHONE ENCOUNTER
Pt returning call for lab results     Pt states that she would be available anytime before 10:30am or after 4pm

## 2022-04-05 ENCOUNTER — LAB ENCOUNTER (OUTPATIENT)
Dept: LAB | Age: 72
End: 2022-04-05
Attending: INTERNAL MEDICINE
Payer: MEDICARE

## 2022-04-05 DIAGNOSIS — R79.1 PROLONGED PTT: ICD-10-CM

## 2022-04-05 LAB — APTT PPP: 31 SECONDS (ref 23.3–35.6)

## 2022-04-05 PROCEDURE — 85732 THROMBOPLASTIN TIME PARTIAL: CPT

## 2022-04-05 PROCEDURE — 85730 THROMBOPLASTIN TIME PARTIAL: CPT

## 2022-04-05 PROCEDURE — 36415 COLL VENOUS BLD VENIPUNCTURE: CPT

## 2022-04-06 ENCOUNTER — TELEPHONE (OUTPATIENT)
Dept: INTERNAL MEDICINE CLINIC | Facility: CLINIC | Age: 72
End: 2022-04-06

## 2022-04-06 NOTE — TELEPHONE ENCOUNTER
Faxed labs and H&P to Dr. Alexandrea Fisher   Confirmation received   Placed in [facility]

## 2022-04-12 ENCOUNTER — TELEPHONE (OUTPATIENT)
Dept: PHYSICAL THERAPY | Facility: HOSPITAL | Age: 72
End: 2022-04-12

## 2022-04-16 ENCOUNTER — LAB ENCOUNTER (OUTPATIENT)
Dept: LAB | Age: 72
End: 2022-04-16
Attending: ORTHOPAEDIC SURGERY
Payer: MEDICARE

## 2022-04-16 DIAGNOSIS — M17.0 PRIMARY OSTEOARTHRITIS OF BOTH KNEES: ICD-10-CM

## 2022-04-17 LAB — SARS-COV-2 RNA RESP QL NAA+PROBE: NOT DETECTED

## 2022-04-19 ENCOUNTER — ANESTHESIA EVENT (OUTPATIENT)
Dept: SURGERY | Facility: HOSPITAL | Age: 72
End: 2022-04-19
Payer: MEDICARE

## 2022-04-19 ENCOUNTER — APPOINTMENT (OUTPATIENT)
Dept: GENERAL RADIOLOGY | Facility: HOSPITAL | Age: 72
End: 2022-04-19
Attending: ORTHOPAEDIC SURGERY
Payer: MEDICARE

## 2022-04-19 ENCOUNTER — HOSPITAL ENCOUNTER (OUTPATIENT)
Facility: HOSPITAL | Age: 72
Discharge: HOME HEALTH | End: 2022-04-20
Attending: ORTHOPAEDIC SURGERY | Admitting: ORTHOPAEDIC SURGERY
Payer: MEDICARE

## 2022-04-19 ENCOUNTER — ANESTHESIA (OUTPATIENT)
Dept: SURGERY | Facility: HOSPITAL | Age: 72
End: 2022-04-19
Payer: MEDICARE

## 2022-04-19 DIAGNOSIS — M17.12 ARTHRITIS OF LEFT KNEE: ICD-10-CM

## 2022-04-19 DIAGNOSIS — M17.0 PRIMARY OSTEOARTHRITIS OF BOTH KNEES: Primary | ICD-10-CM

## 2022-04-19 PROCEDURE — 76942 ECHO GUIDE FOR BIOPSY: CPT | Performed by: ANESTHESIOLOGY

## 2022-04-19 PROCEDURE — 27447 TOTAL KNEE ARTHROPLASTY: CPT | Performed by: ORTHOPAEDIC SURGERY

## 2022-04-19 PROCEDURE — 0SRD069 REPLACEMENT OF LEFT KNEE JOINT WITH OXIDIZED ZIRCONIUM ON POLYETHYLENE SYNTHETIC SUBSTITUTE, CEMENTED, OPEN APPROACH: ICD-10-PCS | Performed by: ORTHOPAEDIC SURGERY

## 2022-04-19 PROCEDURE — 73560 X-RAY EXAM OF KNEE 1 OR 2: CPT | Performed by: ORTHOPAEDIC SURGERY

## 2022-04-19 DEVICE — IMPLANTABLE DEVICE
Type: IMPLANTABLE DEVICE | Site: KNEE | Status: FUNCTIONAL
Brand: BIOMET® BONE CEMENT R

## 2022-04-19 DEVICE — FEMUR SPHERE CEMENTED LEFT, SIZE 3+
Type: IMPLANTABLE DEVICE | Site: KNEE | Status: FUNCTIONAL
Brand: GMK SPHERE TOTAL KNEE SYSTEM

## 2022-04-19 DEVICE — FIXED TIBIAL TRAY CEMENTED LEFT, SIZE 3
Type: IMPLANTABLE DEVICE | Site: KNEE | Status: FUNCTIONAL
Brand: GMK PRIMARY TOTAL KNEE SYSTEM

## 2022-04-19 DEVICE — TIBIAL INSERT FIXED SPHERE FLEX #3/10 MM L
Type: IMPLANTABLE DEVICE | Site: KNEE | Status: FUNCTIONAL
Brand: GMK SPHERE TOTAL KNEE SYSTEM

## 2022-04-19 RX ORDER — HYDROMORPHONE HYDROCHLORIDE 1 MG/ML
0.4 INJECTION, SOLUTION INTRAMUSCULAR; INTRAVENOUS; SUBCUTANEOUS EVERY 5 MIN PRN
Status: DISCONTINUED | OUTPATIENT
Start: 2022-04-19 | End: 2022-04-19 | Stop reason: HOSPADM

## 2022-04-19 RX ORDER — MELATONIN
6 NIGHTLY PRN
Status: DISCONTINUED | OUTPATIENT
Start: 2022-04-19 | End: 2022-04-20

## 2022-04-19 RX ORDER — DIPHENHYDRAMINE HYDROCHLORIDE 50 MG/ML
25 INJECTION INTRAMUSCULAR; INTRAVENOUS ONCE AS NEEDED
Status: ACTIVE | OUTPATIENT
Start: 2022-04-19 | End: 2022-04-19

## 2022-04-19 RX ORDER — ACETAMINOPHEN 325 MG/1
650 TABLET ORAL ONCE
Status: COMPLETED | OUTPATIENT
Start: 2022-04-19 | End: 2022-04-19

## 2022-04-19 RX ORDER — SODIUM PHOSPHATE, DIBASIC AND SODIUM PHOSPHATE, MONOBASIC 7; 19 G/133ML; G/133ML
1 ENEMA RECTAL ONCE AS NEEDED
Status: DISCONTINUED | OUTPATIENT
Start: 2022-04-19 | End: 2022-04-20

## 2022-04-19 RX ORDER — PROCHLORPERAZINE EDISYLATE 5 MG/ML
10 INJECTION INTRAMUSCULAR; INTRAVENOUS EVERY 6 HOURS PRN
Status: DISCONTINUED | OUTPATIENT
Start: 2022-04-19 | End: 2022-04-20

## 2022-04-19 RX ORDER — HYDROMORPHONE HYDROCHLORIDE 1 MG/ML
0.4 INJECTION, SOLUTION INTRAMUSCULAR; INTRAVENOUS; SUBCUTANEOUS EVERY 2 HOUR PRN
Status: DISCONTINUED | OUTPATIENT
Start: 2022-04-19 | End: 2022-04-20

## 2022-04-19 RX ORDER — ACETAMINOPHEN 500 MG
1000 TABLET ORAL ONCE
Status: DISCONTINUED | OUTPATIENT
Start: 2022-04-19 | End: 2022-04-19 | Stop reason: HOSPADM

## 2022-04-19 RX ORDER — ACETAMINOPHEN 325 MG/1
TABLET ORAL
Status: COMPLETED
Start: 2022-04-19 | End: 2022-04-19

## 2022-04-19 RX ORDER — DOCUSATE SODIUM 100 MG/1
100 CAPSULE, LIQUID FILLED ORAL 2 TIMES DAILY
Status: DISCONTINUED | OUTPATIENT
Start: 2022-04-19 | End: 2022-04-20

## 2022-04-19 RX ORDER — SODIUM CHLORIDE, SODIUM LACTATE, POTASSIUM CHLORIDE, CALCIUM CHLORIDE 600; 310; 30; 20 MG/100ML; MG/100ML; MG/100ML; MG/100ML
INJECTION, SOLUTION INTRAVENOUS CONTINUOUS
Status: DISCONTINUED | OUTPATIENT
Start: 2022-04-19 | End: 2022-04-19 | Stop reason: HOSPADM

## 2022-04-19 RX ORDER — MELATONIN
325
Status: DISCONTINUED | OUTPATIENT
Start: 2022-04-19 | End: 2022-04-20

## 2022-04-19 RX ORDER — DEXAMETHASONE SODIUM PHOSPHATE 4 MG/ML
VIAL (ML) INJECTION AS NEEDED
Status: DISCONTINUED | OUTPATIENT
Start: 2022-04-19 | End: 2022-04-19 | Stop reason: SURG

## 2022-04-19 RX ORDER — MEPERIDINE HYDROCHLORIDE 25 MG/ML
12.5 INJECTION INTRAMUSCULAR; INTRAVENOUS; SUBCUTANEOUS AS NEEDED
Status: DISCONTINUED | OUTPATIENT
Start: 2022-04-19 | End: 2022-04-19 | Stop reason: HOSPADM

## 2022-04-19 RX ORDER — DEXAMETHASONE SODIUM PHOSPHATE 4 MG/ML
4 VIAL (ML) INJECTION AS NEEDED
Status: DISCONTINUED | OUTPATIENT
Start: 2022-04-19 | End: 2022-04-19 | Stop reason: HOSPADM

## 2022-04-19 RX ORDER — POLYETHYLENE GLYCOL 3350 17 G/17G
17 POWDER, FOR SOLUTION ORAL DAILY PRN
Status: DISCONTINUED | OUTPATIENT
Start: 2022-04-19 | End: 2022-04-20

## 2022-04-19 RX ORDER — OXYCODONE HYDROCHLORIDE 5 MG/1
5 TABLET ORAL EVERY 4 HOURS PRN
Status: DISCONTINUED | OUTPATIENT
Start: 2022-04-19 | End: 2022-04-20

## 2022-04-19 RX ORDER — SODIUM CHLORIDE, SODIUM LACTATE, POTASSIUM CHLORIDE, CALCIUM CHLORIDE 600; 310; 30; 20 MG/100ML; MG/100ML; MG/100ML; MG/100ML
INJECTION, SOLUTION INTRAVENOUS CONTINUOUS
Status: DISCONTINUED | OUTPATIENT
Start: 2022-04-19 | End: 2022-04-20

## 2022-04-19 RX ORDER — TRANEXAMIC ACID 10 MG/ML
1000 INJECTION, SOLUTION INTRAVENOUS ONCE
Status: COMPLETED | OUTPATIENT
Start: 2022-04-19 | End: 2022-04-19

## 2022-04-19 RX ORDER — ASPIRIN 325 MG
325 TABLET, DELAYED RELEASE (ENTERIC COATED) ORAL 2 TIMES DAILY
Status: DISCONTINUED | OUTPATIENT
Start: 2022-04-19 | End: 2022-04-20

## 2022-04-19 RX ORDER — ONDANSETRON 2 MG/ML
4 INJECTION INTRAMUSCULAR; INTRAVENOUS AS NEEDED
Status: DISCONTINUED | OUTPATIENT
Start: 2022-04-19 | End: 2022-04-19 | Stop reason: HOSPADM

## 2022-04-19 RX ORDER — SENNOSIDES 8.6 MG
17.2 TABLET ORAL NIGHTLY
Status: DISCONTINUED | OUTPATIENT
Start: 2022-04-19 | End: 2022-04-20

## 2022-04-19 RX ORDER — OXYCODONE HYDROCHLORIDE 5 MG/1
2.5 TABLET ORAL EVERY 4 HOURS PRN
Status: DISCONTINUED | OUTPATIENT
Start: 2022-04-19 | End: 2022-04-20

## 2022-04-19 RX ORDER — DIPHENHYDRAMINE HCL 25 MG
25 CAPSULE ORAL EVERY 4 HOURS PRN
Status: DISCONTINUED | OUTPATIENT
Start: 2022-04-19 | End: 2022-04-20

## 2022-04-19 RX ORDER — HYDROCODONE BITARTRATE AND ACETAMINOPHEN 5; 325 MG/1; MG/1
2 TABLET ORAL AS NEEDED
Status: DISCONTINUED | OUTPATIENT
Start: 2022-04-19 | End: 2022-04-19 | Stop reason: HOSPADM

## 2022-04-19 RX ORDER — TRAMADOL HYDROCHLORIDE 50 MG/1
50 TABLET ORAL EVERY 6 HOURS
Status: DISCONTINUED | OUTPATIENT
Start: 2022-04-19 | End: 2022-04-20

## 2022-04-19 RX ORDER — MIDAZOLAM HYDROCHLORIDE 1 MG/ML
INJECTION INTRAMUSCULAR; INTRAVENOUS AS NEEDED
Status: DISCONTINUED | OUTPATIENT
Start: 2022-04-19 | End: 2022-04-19 | Stop reason: SURG

## 2022-04-19 RX ORDER — MIDAZOLAM HYDROCHLORIDE 1 MG/ML
INJECTION INTRAMUSCULAR; INTRAVENOUS
Status: COMPLETED
Start: 2022-04-19 | End: 2022-04-19

## 2022-04-19 RX ORDER — KETOROLAC TROMETHAMINE 30 MG/ML
INJECTION, SOLUTION INTRAMUSCULAR; INTRAVENOUS AS NEEDED
Status: DISCONTINUED | OUTPATIENT
Start: 2022-04-19 | End: 2022-04-19 | Stop reason: SURG

## 2022-04-19 RX ORDER — CEFAZOLIN SODIUM/WATER 2 G/20 ML
2 SYRINGE (ML) INTRAVENOUS EVERY 8 HOURS
Status: COMPLETED | OUTPATIENT
Start: 2022-04-19 | End: 2022-04-19

## 2022-04-19 RX ORDER — LIDOCAINE HYDROCHLORIDE 10 MG/ML
INJECTION, SOLUTION EPIDURAL; INFILTRATION; INTRACAUDAL; PERINEURAL AS NEEDED
Status: DISCONTINUED | OUTPATIENT
Start: 2022-04-19 | End: 2022-04-19 | Stop reason: SURG

## 2022-04-19 RX ORDER — ACETAMINOPHEN 500 MG
1000 TABLET ORAL ONCE AS NEEDED
Status: DISCONTINUED | OUTPATIENT
Start: 2022-04-19 | End: 2022-04-19 | Stop reason: HOSPADM

## 2022-04-19 RX ORDER — ONDANSETRON 2 MG/ML
4 INJECTION INTRAMUSCULAR; INTRAVENOUS EVERY 4 HOURS PRN
Status: DISCONTINUED | OUTPATIENT
Start: 2022-04-19 | End: 2022-04-20

## 2022-04-19 RX ORDER — HYDROMORPHONE HYDROCHLORIDE 1 MG/ML
0.2 INJECTION, SOLUTION INTRAMUSCULAR; INTRAVENOUS; SUBCUTANEOUS EVERY 2 HOUR PRN
Status: DISCONTINUED | OUTPATIENT
Start: 2022-04-19 | End: 2022-04-20

## 2022-04-19 RX ORDER — KETOROLAC TROMETHAMINE 15 MG/ML
15 INJECTION, SOLUTION INTRAMUSCULAR; INTRAVENOUS EVERY 6 HOURS
Status: COMPLETED | OUTPATIENT
Start: 2022-04-19 | End: 2022-04-20

## 2022-04-19 RX ORDER — BUPIVACAINE HYDROCHLORIDE 5 MG/ML
INJECTION, SOLUTION EPIDURAL; INTRACAUDAL AS NEEDED
Status: DISCONTINUED | OUTPATIENT
Start: 2022-04-19 | End: 2022-04-19 | Stop reason: SURG

## 2022-04-19 RX ORDER — METOCLOPRAMIDE HYDROCHLORIDE 5 MG/ML
10 INJECTION INTRAMUSCULAR; INTRAVENOUS AS NEEDED
Status: DISCONTINUED | OUTPATIENT
Start: 2022-04-19 | End: 2022-04-19 | Stop reason: HOSPADM

## 2022-04-19 RX ORDER — HYDROCODONE BITARTRATE AND ACETAMINOPHEN 5; 325 MG/1; MG/1
1 TABLET ORAL AS NEEDED
Status: DISCONTINUED | OUTPATIENT
Start: 2022-04-19 | End: 2022-04-19 | Stop reason: HOSPADM

## 2022-04-19 RX ORDER — HYDROMORPHONE HYDROCHLORIDE 1 MG/ML
INJECTION, SOLUTION INTRAMUSCULAR; INTRAVENOUS; SUBCUTANEOUS
Status: COMPLETED
Start: 2022-04-19 | End: 2022-04-19

## 2022-04-19 RX ORDER — METOCLOPRAMIDE HYDROCHLORIDE 5 MG/ML
INJECTION INTRAMUSCULAR; INTRAVENOUS AS NEEDED
Status: DISCONTINUED | OUTPATIENT
Start: 2022-04-19 | End: 2022-04-19 | Stop reason: SURG

## 2022-04-19 RX ORDER — MIDAZOLAM HYDROCHLORIDE 1 MG/ML
1 INJECTION INTRAMUSCULAR; INTRAVENOUS EVERY 5 MIN PRN
Status: DISCONTINUED | OUTPATIENT
Start: 2022-04-19 | End: 2022-04-19 | Stop reason: HOSPADM

## 2022-04-19 RX ORDER — DEXAMETHASONE SODIUM PHOSPHATE 10 MG/ML
8 INJECTION, SOLUTION INTRAMUSCULAR; INTRAVENOUS ONCE
Status: COMPLETED | OUTPATIENT
Start: 2022-04-20 | End: 2022-04-20

## 2022-04-19 RX ORDER — ONDANSETRON 2 MG/ML
INJECTION INTRAMUSCULAR; INTRAVENOUS AS NEEDED
Status: DISCONTINUED | OUTPATIENT
Start: 2022-04-19 | End: 2022-04-19 | Stop reason: SURG

## 2022-04-19 RX ORDER — NALOXONE HYDROCHLORIDE 0.4 MG/ML
80 INJECTION, SOLUTION INTRAMUSCULAR; INTRAVENOUS; SUBCUTANEOUS AS NEEDED
Status: DISCONTINUED | OUTPATIENT
Start: 2022-04-19 | End: 2022-04-19 | Stop reason: HOSPADM

## 2022-04-19 RX ORDER — CEFAZOLIN SODIUM/WATER 2 G/20 ML
2 SYRINGE (ML) INTRAVENOUS ONCE
Status: COMPLETED | OUTPATIENT
Start: 2022-04-19 | End: 2022-04-19

## 2022-04-19 RX ORDER — DIPHENHYDRAMINE HYDROCHLORIDE 50 MG/ML
12.5 INJECTION INTRAMUSCULAR; INTRAVENOUS EVERY 4 HOURS PRN
Status: DISCONTINUED | OUTPATIENT
Start: 2022-04-19 | End: 2022-04-20

## 2022-04-19 RX ORDER — BISACODYL 10 MG
10 SUPPOSITORY, RECTAL RECTAL
Status: DISCONTINUED | OUTPATIENT
Start: 2022-04-19 | End: 2022-04-20

## 2022-04-19 RX ORDER — ALPRAZOLAM 0.25 MG/1
0.25 TABLET ORAL 2 TIMES DAILY PRN
Status: DISCONTINUED | OUTPATIENT
Start: 2022-04-19 | End: 2022-04-20

## 2022-04-19 RX ORDER — LABETALOL HYDROCHLORIDE 5 MG/ML
5 INJECTION, SOLUTION INTRAVENOUS EVERY 5 MIN PRN
Status: DISCONTINUED | OUTPATIENT
Start: 2022-04-19 | End: 2022-04-19 | Stop reason: HOSPADM

## 2022-04-19 RX ORDER — ACETAMINOPHEN 325 MG/1
650 TABLET ORAL 4 TIMES DAILY
Status: DISCONTINUED | OUTPATIENT
Start: 2022-04-19 | End: 2022-04-20

## 2022-04-19 RX ADMIN — KETOROLAC TROMETHAMINE 15 MG: 30 INJECTION, SOLUTION INTRAMUSCULAR; INTRAVENOUS at 09:33:00

## 2022-04-19 RX ADMIN — DEXAMETHASONE SODIUM PHOSPHATE 8 MG: 4 MG/ML VIAL (ML) INJECTION at 07:57:00

## 2022-04-19 RX ADMIN — LIDOCAINE HYDROCHLORIDE 50 MG: 10 INJECTION, SOLUTION EPIDURAL; INFILTRATION; INTRACAUDAL; PERINEURAL at 07:29:00

## 2022-04-19 RX ADMIN — SODIUM CHLORIDE, SODIUM LACTATE, POTASSIUM CHLORIDE, CALCIUM CHLORIDE: 600; 310; 30; 20 INJECTION, SOLUTION INTRAVENOUS at 08:42:00

## 2022-04-19 RX ADMIN — METOCLOPRAMIDE HYDROCHLORIDE 10 MG: 5 INJECTION INTRAMUSCULAR; INTRAVENOUS at 09:33:00

## 2022-04-19 RX ADMIN — MIDAZOLAM HYDROCHLORIDE 1 MG: 1 INJECTION INTRAMUSCULAR; INTRAVENOUS at 07:10:00

## 2022-04-19 RX ADMIN — SODIUM CHLORIDE, SODIUM LACTATE, POTASSIUM CHLORIDE, CALCIUM CHLORIDE: 600; 310; 30; 20 INJECTION, SOLUTION INTRAVENOUS at 09:37:00

## 2022-04-19 RX ADMIN — CEFAZOLIN SODIUM/WATER 2 G: 2 G/20 ML SYRINGE (ML) INTRAVENOUS at 07:43:00

## 2022-04-19 RX ADMIN — ONDANSETRON 4 MG: 2 INJECTION INTRAMUSCULAR; INTRAVENOUS at 09:33:00

## 2022-04-19 RX ADMIN — SODIUM CHLORIDE, SODIUM LACTATE, POTASSIUM CHLORIDE, CALCIUM CHLORIDE: 600; 310; 30; 20 INJECTION, SOLUTION INTRAVENOUS at 07:50:00

## 2022-04-19 RX ADMIN — TRANEXAMIC ACID 1000 MG: 10 INJECTION, SOLUTION INTRAVENOUS at 07:44:00

## 2022-04-19 RX ADMIN — BUPIVACAINE HYDROCHLORIDE 2.5 ML: 5 INJECTION, SOLUTION EPIDURAL; INTRACAUDAL at 07:14:00

## 2022-04-19 RX ADMIN — MIDAZOLAM HYDROCHLORIDE 3 MG: 1 INJECTION INTRAMUSCULAR; INTRAVENOUS at 07:01:00

## 2022-04-19 NOTE — OPERATIVE REPORT
Operative Note    Patient Name/: Beba Busch 10/21/1950  Date: 2022  Location: [facility]  Preoperative Diagnosis: Left knee osteoarthritis  Postoperative Diagnosis: Same  Operation: Left total knee arthroplasty mechanical alignment  Primary Surgeon: Sandy Gannon  Assistant: Josue Lincoln surgical assistant first assist.  Charo Bowers also assisted in this case. Please note a skilled surgical assistant was necessary for this case in order to assist with patient positioning, prepping, draping, incision, exposure, implant placement, wound closure. Indications: 69-year-old female with symptomatic severe valgus osteoarthritis of the left knee failed nonsurgical treatment and sought surgery with knee replacement  Surgical Findings: End-stage valgus osteoarthritis  Operative Report: After informed consent, the left lower extremity was marked. Patient was brought to the operating room where spinal anesthesia was induced. Preoperative exam demonstrated range of motion from full extension/touch hyperextension, flexion around 120. The left lower extremity was prepped and draped in sterile fashion. Timeout was performed to verify correct surgical site and procedure. 2 g of Ancef was given within 1 hour of incision. Additionally, 1 g tranexamic acid was given intravenously. Next the limb was gravity exsanguinated and tourniquet inflated. Incision was made anteriorly from 2 fingerbreadths proximal to the patella down along the medial aspect of the tibial tubercle. This was carried down to the extensor mechanism. Medial and lateral flaps were developed. The VMO muscle belly was identified. Full-thickness medial parapatellar arthrotomy was made from 2 fingerbreadths proximal to the patella along the VMO muscle belly, around the medial patella and patellar tendon. The fat pad was dissected free from the patellar tendon and reflected medially.   Subperiosteal dissection was carried out posteriorly about the proximal medial aspect of the tibia. Osteophytes were removed from the proximal medial tibia. The lateral patellofemoral plica was incised, and the patella was everted and denervated. The synovium over the anterior distal femur was teed open. Next the knee was flexed up, and remnant of the ACL and lateral meniscus were excised. Whitesides line was marked on the trochlear groove. I used an intramedullary reamer to open up the femur for our intramedullary alignment guide. The distal femoral cutting block was placed on the distal femur to take off 10 mm of bone. It sat down on the distal medial femur, measuring to take off more medial bone than lateral.  This was pinned in place. A lakshmi's wing was used to check the cut, and the cut was performed. The distal medial femur fragment measured 8 mm. Next the knee was flexed and the tibial crest was marked. An extra medullary tibial cutting guide was placed above the ankle, measuring to take off a few millimeters off the medial side, pinned in line with the tibial crest in the coronal plane and with a few degrees of posterior slope in the sagittal plane. The proximal tibia was cut, using a Z retractor to protect MCL, patellar tendon and lateral soft tissues. After this portion of bone was removed, the knee was brought into extension and a lamina  was used to open up the extension space. What remained of the medial and lateral menisci were removed. Spacer block was placed with a T-handle drop-down armando to verify appropriate cut angle. The knee was balanced to varus and valgus stress in full extension. Next the knee was flexed up, and the femoral sizing block with stylus was used. This sized for a size 3+ femur. Pins were placed, and the sizing block removed. Pins were noted to be parallel to the epicondylar axis and perpendicular to [employer]. The 4-in-1 cutting block was placed and screwed down.   Anterior distal femur was cut, demonstrating a positive grand piano sign. Posterior condyles were cut, measuring 7 on the posterior medial cut. Chamfers were then cut. The cutting block was removed, and the knee was flexed with a lamina  to open up the posterior knee joint. Notch contents and PCL were removed. Posterior condylar recesses were opened, and posterior osteophytes chiseled out. The knee was brought into extension, and sized for a tibial baseplate. Trials were then placed. With the 3+ femur, the 3 tibia, and a 10 polyethylene liner, the knee came to full extension with excellent varus valgus stability. In mid flexion, there was 2 mm of varus laxity, less than half a millimeter valgus laxity, Lachman's rotating around the medial femoral condyle. At 90 degrees, anterior drawer was less than 5 mm rotating around medial femoral condyle. Gravity flexion was to 120 with the patella tracking centrally. The tibial component rotation was marked, and the tibial trial removed. The femoral lugs were drilled, and the anterior chamfer finishing guide was placed to cut the finishing anterior chamfer cut. The knee was then flexed, and the tibia brought forward to place the tibial baseplate trial.  This was pinned in place, and the drill and keel punch were used to prepare the proximal tibia. The knee was then brought into extension, the patella everted, and it was measured to be only 17 mm, therefore lateral facetectomy and circumferential denervation/removal of osteophytes was performed. Next final components were opened, bony surfaces were washed and dried, and periarticular pain cocktail was injected with 30 mL in the posterior capsule, 10 mL into lateral femoral and tibial periosteum, 10 mL into medial femoral and tibial periosteum, and 10 mL into extensor mechanism. Cement was then mixed.   The tibial component was cemented along with the proximal tibial bone surface, the component was impacted into place followed by removal of excess cement. The femur was reduced onto the tibia, and cement was placed on the cut surface of the femur. The cemented femoral component was then impacted down, with removal of excess cement. The knee was brought into full extension and an axial load was held across the joint. Once cement had hardened, the knee was trialed again. Optimal stability was achieved with a 10 mm polyethylene liner, with exam demonstrating same as with trials. The trial poly was removed, the knee was washed out, any loose bodies removed, and final poly was placed. Betadine lavage was performed. Closure was performed with 5 Ethibond for the arthrotomy, 2-0 Vicryl for skin, followed by staples. A sterile dressing was applied, and the patient was brought to PACU in good condition.   Anesthesia: Spinal plus abductor canal block  Complications: None  Specimens: None  Blood loss: 25 mL  Fluids: See anesthesia report  Implants: Medacta GM K sphere 3+ femur, 3 tibia, 10 poly-  Drains: None  Condition: Good  Disposition: Floor    -Weightbearing as tolerated, PT OT  -DVT prophylaxis mechanical plus aspirin twice daily  -Pain control with oral medications  -Perioperative Helene Phelps MD, [address] Orthopedic Surgery  Phone [phone]  Fax [phone]

## 2022-04-19 NOTE — ANESTHESIA PROCEDURE NOTES
Spinal Block  Performed by: Genevieve Howard MD  Authorized by: Genevieve Howard MD       General Information and Staff    Start Time:  4/19/2022 7:09 AM  End Time:  4/19/2022 7:14 AM  Anesthesiologist:  Genevieve Howard MD  Performed by:   Anesthesiologist  Patient Location:  OR  Site identification: surface landmarks    Reason for Block: surgical anesthesia    Preanesthetic Checklist: patient identified, IV checked, risks and benefits discussed, monitors and equipment checked, pre-op evaluation, timeout performed, anesthesia consent and sterile technique used      Procedure Details    Patient Position:  Sitting  Prep: Betadine    Monitoring:  Cardiac monitor, heart rate and continuous pulse ox  Approach:  Midline  Location:  L3-4  Injection Technique:  Single-shot    Needle    Needle Type:  Sprotte  Needle Gauge:  24 G  Needle Length:  3.5 in    Assessment    Sensory Level:  T8  Events: clear CSF, CSF aspirated, well tolerated and blood negative      Additional Comments

## 2022-04-19 NOTE — ANESTHESIA PROCEDURE NOTES
Regional Block  Performed by: Nedra Brown MD  Authorized by: Nedra Brown MD       General Information and Staff    Start Time:  4/19/2022 7:16 AM  End Time:  4/19/2022 7:20 AM  Anesthesiologist:  Nedra Brown MD  Patient Location:  OR    Block Placement: Post Induction  Site Identification: real time ultrasound guided and image stored and retrievable    Block site/laterality marked before start: site marked  Reason for Block: at surgeon's request and post-op pain management    Preanesthetic Checklist: 2 patient identifers, IV checked, site marked, risks and benefits discussed, monitors and equipment checked, pre-op evaluation, timeout performed, anesthesia consent, sterile technique used, no prohibitive neurological deficits and no local skin infection at insertion site      Procedure Details    Patient Position:  Supine  Prep: ChloraPrep    Monitoring:  Cardiac monitor, continuous pulse ox and blood pressure cuff  Block Type: Adductor canal  Laterality:  Left  Injection Technique:  Single-shot    Needle    Needle Type:  Short-bevel and echogenic  Needle Gauge:  20 G  Needle Length:  100 mm  Needle Localization:  Ultrasound guidance  Reason for Ultrasound Use: appropriate spread of the medication was noted in real time and no ultrasound evidence of intravascular and/or intraneural injection            Assessment    Injection Assessment:  Good spread noted, negative resistance, negative aspiration for heme, incremental injection, low pressure, local visualized surrounding nerve on ultrasound and no pain on injection  Heart Rate Change: No    - Patient tolerated block procedure well without evidence of immediate block related complications.      Medications      Additional Comments    Ropivicaine 0.375% 30ml + dexamethasone PF 2mg + buprenorphine 150mcg

## 2022-04-19 NOTE — PLAN OF CARE
Patient A&Ox4, VSS on room air. 1L O2 PRN while sleeping. POD #0 L knee, dressing C/D/I, gel ice applied, compression sleeve on. Knee buckling when standing, knee immobilizer ordered. Reporting moderate pain to L knee, PO pain medication given with relief. PT/OT to see. Plan of care reviewed with patient, all questions answered, verbalized understanding.

## 2022-04-19 NOTE — CM/SW NOTE
Department  notified of request for [place] [facility] referrals started. Assigned CM/SW to follow up with pt/family on further discharge planning.      Charu MALONE [facility]

## 2022-04-20 VITALS
WEIGHT: 190.63 LBS | OXYGEN SATURATION: 99 % | DIASTOLIC BLOOD PRESSURE: 45 MMHG | RESPIRATION RATE: 16 BRPM | HEART RATE: 70 BPM | TEMPERATURE: 98 F | HEIGHT: 63 IN | BODY MASS INDEX: 33.78 KG/M2 | SYSTOLIC BLOOD PRESSURE: 109 MMHG

## 2022-04-20 PROCEDURE — 99024 POSTOP FOLLOW-UP VISIT: CPT | Performed by: ORTHOPAEDIC SURGERY

## 2022-04-20 RX ORDER — TRAMADOL HYDROCHLORIDE 50 MG/1
50 TABLET ORAL EVERY 8 HOURS
Qty: 45 TABLET | Refills: 0 | Status: SHIPPED | OUTPATIENT
Start: 2022-04-20 | End: 2022-05-05

## 2022-04-20 RX ORDER — CELECOXIB 200 MG/1
200 CAPSULE ORAL DAILY
Qty: 30 CAPSULE | Refills: 0 | Status: SHIPPED | OUTPATIENT
Start: 2022-04-20 | End: 2022-05-20

## 2022-04-20 RX ORDER — PANTOPRAZOLE SODIUM 20 MG/1
20 TABLET, DELAYED RELEASE ORAL DAILY
Qty: 30 TABLET | Refills: 0 | Status: SHIPPED | OUTPATIENT
Start: 2022-04-20 | End: 2022-05-20

## 2022-04-20 RX ORDER — ACETAMINOPHEN 500 MG
1000 TABLET ORAL EVERY 8 HOURS
Qty: 90 TABLET | Refills: 0 | Status: SHIPPED | OUTPATIENT
Start: 2022-04-20 | End: 2022-05-05

## 2022-04-20 RX ORDER — PSEUDOEPHEDRINE HCL 30 MG
100 TABLET ORAL 2 TIMES DAILY PRN
Qty: 30 CAPSULE | Refills: 0 | Status: SHIPPED | OUTPATIENT
Start: 2022-04-20

## 2022-04-20 RX ORDER — OXYCODONE HYDROCHLORIDE 5 MG/1
TABLET ORAL EVERY 4 HOURS PRN
Qty: 40 TABLET | Refills: 0 | Status: SHIPPED | OUTPATIENT
Start: 2022-04-20

## 2022-04-20 NOTE — PROGRESS NOTES
Daughter at bedside. Reviewed indications, side effects of pain medication/narcotics and constipation prevention. Stressed importance of increased fluids/roughage in diet, continued use of stool softeners along with laxatives and suppositories as needed while taking narcotics even when at home. Pt verbalized understanding. Teachback done on ankle pumps and incentive spirometry use 10 times every hour w/a for each. Reviewed spandagrip, dressing changes, showering, elevation and cold therapy before beginning discharge education video. Reviewed discharge plan with patient. Solicited and answered any questions regarding care.

## 2022-04-20 NOTE — CM/SW NOTE
Patient was arranged pre-operatively with [address] [place] [address] Drive: [phone]  F: [phone].   Orders sent via [employer] and information added to Daryl Gan LCSW

## 2022-04-20 NOTE — PLAN OF CARE
A&O x 4. VSS. O2 1L per NC. Denies N/T to BLE's. LLE pain well controlled with scheduled pain medication. Aquacel dressing to left knee C/D/I. Gel ice in place. Voiding without issue per bedpan. PT to re-eval tomorrow, severe buckling with KI while attempting ambulation today. OT still to see. SCD's/Spanda/ASA. Reviewed POC, pain management, and fall precautions with pt. Plan likely home with HHPT. Will continue to monitor.

## 2022-04-20 NOTE — PLAN OF CARE
Pt a&O. On room air. Encouraged use of incentive spirometer and ankle pump exercises every hour while awake. Scds to BLE. Spanda- to LLE. Tolerating diet with good appetite. Last BM 4/18/22. Mirlax given this AM. Voiding w/o difficulty. Pain managed with oral medications. Participating in therapy as ordered. Up with standby assist using walker and gait belt. Pt reminded to \"call; don't fall\". Left knee aquacel silver drsg C/D/I. Gel ice packs on for pain/swelling. Pt ready for dc home with [facility]. Pt had her Rx filled by our out patient pharmacy. Pt's daughter at bedside and updated with plan of care.

## 2022-05-01 ENCOUNTER — MED REC SCAN ONLY (OUTPATIENT)
Dept: ORTHOPEDICS CLINIC | Facility: CLINIC | Age: 72
End: 2022-05-01

## 2022-05-02 ENCOUNTER — TELEPHONE (OUTPATIENT)
Dept: PHYSICAL THERAPY | Facility: HOSPITAL | Age: 72
End: 2022-05-02

## 2022-05-03 ENCOUNTER — OFFICE VISIT (OUTPATIENT)
Dept: PHYSICAL THERAPY | Age: 72
End: 2022-05-03
Attending: ORTHOPAEDIC SURGERY
Payer: MEDICARE

## 2022-05-03 DIAGNOSIS — M17.12 ARTHRITIS OF LEFT KNEE: ICD-10-CM

## 2022-05-03 PROCEDURE — 97110 THERAPEUTIC EXERCISES: CPT

## 2022-05-03 PROCEDURE — 97161 PT EVAL LOW COMPLEX 20 MIN: CPT

## 2022-05-04 ENCOUNTER — OFFICE VISIT (OUTPATIENT)
Dept: ORTHOPEDICS CLINIC | Facility: CLINIC | Age: 72
End: 2022-05-04
Payer: MEDICARE

## 2022-05-04 VITALS — OXYGEN SATURATION: 98 % | HEART RATE: 83 BPM

## 2022-05-04 DIAGNOSIS — Z96.652 STATUS POST LEFT KNEE REPLACEMENT: Primary | ICD-10-CM

## 2022-05-04 PROCEDURE — 99024 POSTOP FOLLOW-UP VISIT: CPT | Performed by: ORTHOPAEDIC SURGERY

## 2022-05-04 PROCEDURE — 1111F DSCHRG MED/CURRENT MED MERGE: CPT | Performed by: ORTHOPAEDIC SURGERY

## 2022-05-04 RX ORDER — DOCUSATE SODIUM 100 MG/1
100 CAPSULE, LIQUID FILLED ORAL 2 TIMES DAILY PRN
Qty: 30 CAPSULE | Refills: 0 | Status: SHIPPED | OUTPATIENT
Start: 2022-05-04

## 2022-05-04 RX ORDER — TRAMADOL HYDROCHLORIDE 50 MG/1
50 TABLET ORAL EVERY 8 HOURS PRN
Qty: 30 TABLET | Refills: 0 | Status: SHIPPED | OUTPATIENT
Start: 2022-05-04

## 2022-05-04 NOTE — PROGRESS NOTES
[facility] Progress Note    Subjective: Patient following up 2 weeks postop from left total knee replacement. She reports she is doing excellent. She has minimal to no pain, ambulating without assist device. No drainage from the incision. She is only been taking tramadol as needed, Tylenol otherwise. She has been taking aspirin twice daily. No drainage from the incision. She did start physical therapy yesterday. Objective: Patient is awake alert no distress ambulating without assist device. Left knee with full extension, flexion and 90. Incision healing well, staples removed. Assessment/Plan: 71-year-old female 2 weeks postop status post left total knee arthroplasty. Patient is doing very well. Continue activity as tolerated, physical therapy prescription provided. Tramadol refilled per request as well as docusate. Incision staples removed, may get it wet in the shower but no soaking it in a pool or tub. Continue aspirin twice daily. She will follow-up in 4 weeks with x-rays.     Diana Espinal MD, [address] [facility]  Phone [phone]  Fax [phone]

## 2022-05-05 ENCOUNTER — APPOINTMENT (OUTPATIENT)
Dept: PHYSICAL THERAPY | Age: 72
End: 2022-05-05
Attending: ORTHOPAEDIC SURGERY
Payer: MEDICARE

## 2022-05-10 ENCOUNTER — OFFICE VISIT (OUTPATIENT)
Dept: PHYSICAL THERAPY | Age: 72
End: 2022-05-10
Attending: ORTHOPAEDIC SURGERY
Payer: MEDICARE

## 2022-05-10 PROCEDURE — 97110 THERAPEUTIC EXERCISES: CPT

## 2022-05-10 PROCEDURE — 97140 MANUAL THERAPY 1/> REGIONS: CPT

## 2022-05-12 ENCOUNTER — APPOINTMENT (OUTPATIENT)
Dept: PHYSICAL THERAPY | Age: 72
End: 2022-05-12
Attending: ORTHOPAEDIC SURGERY
Payer: MEDICARE

## 2022-05-13 ENCOUNTER — OFFICE VISIT (OUTPATIENT)
Dept: PHYSICAL THERAPY | Age: 72
End: 2022-05-13
Attending: ORTHOPAEDIC SURGERY
Payer: MEDICARE

## 2022-05-13 PROCEDURE — 97140 MANUAL THERAPY 1/> REGIONS: CPT

## 2022-05-13 PROCEDURE — 97110 THERAPEUTIC EXERCISES: CPT

## 2022-05-17 ENCOUNTER — OFFICE VISIT (OUTPATIENT)
Dept: PHYSICAL THERAPY | Age: 72
End: 2022-05-17
Attending: ORTHOPAEDIC SURGERY
Payer: MEDICARE

## 2022-05-17 PROCEDURE — 97110 THERAPEUTIC EXERCISES: CPT

## 2022-05-17 PROCEDURE — 97140 MANUAL THERAPY 1/> REGIONS: CPT

## 2022-05-19 ENCOUNTER — OFFICE VISIT (OUTPATIENT)
Dept: PHYSICAL THERAPY | Age: 72
End: 2022-05-19
Attending: ORTHOPAEDIC SURGERY
Payer: MEDICARE

## 2022-05-19 PROCEDURE — 97110 THERAPEUTIC EXERCISES: CPT

## 2022-05-19 PROCEDURE — 97140 MANUAL THERAPY 1/> REGIONS: CPT

## 2022-05-23 ENCOUNTER — TELEPHONE (OUTPATIENT)
Dept: ORTHOPEDICS CLINIC | Facility: CLINIC | Age: 72
End: 2022-05-23

## 2022-05-23 RX ORDER — TRAMADOL HYDROCHLORIDE 50 MG/1
50 TABLET ORAL EVERY 8 HOURS PRN
Qty: 30 TABLET | Refills: 0 | Status: SHIPPED | OUTPATIENT
Start: 2022-05-23

## 2022-05-23 NOTE — TELEPHONE ENCOUNTER
Post op patient 4/19/2022. Patient has a lot of pain in her left knee and want to see Dr. Duarte Jones. Please advise.

## 2022-05-23 NOTE — TELEPHONE ENCOUNTER
Surgery: LTKA-4/19/22    Spoke with patient regarding her left knee pain. Pt stated that since 5/12/22 she has been having a sharp increase of pain on the posterior and lateral side of the left knee. Pt denies redness/warmth/drainage/deformity at the knee, and stated that she is no more swollen now than just after surgery. No concerns with walking. While speaking with patient, the days she speaks of the most pain occur on therapy dates. Pt stated she has been icing, taking 1/4 tab Oxycodone (only as needed), Tramadol, and Tylenol with some relief. Pt notified to keep her appt with Dr. Arash Rodriguez 6/1 for evaluation, continue with her PT and be sure to take her Tramadol 30 minutes before the session, and continue Tylenol and icing for the pain. No further questions at this time. Pt does need a refill of Tramadol. Pended to this encounter for review.

## 2022-05-24 ENCOUNTER — OFFICE VISIT (OUTPATIENT)
Dept: PHYSICAL THERAPY | Age: 72
End: 2022-05-24
Attending: ORTHOPAEDIC SURGERY
Payer: MEDICARE

## 2022-05-24 ENCOUNTER — APPOINTMENT (OUTPATIENT)
Dept: PHYSICAL THERAPY | Age: 72
End: 2022-05-24
Attending: ORTHOPAEDIC SURGERY
Payer: MEDICARE

## 2022-05-24 PROCEDURE — 97140 MANUAL THERAPY 1/> REGIONS: CPT

## 2022-05-24 PROCEDURE — 97110 THERAPEUTIC EXERCISES: CPT

## 2022-05-26 ENCOUNTER — APPOINTMENT (OUTPATIENT)
Dept: PHYSICAL THERAPY | Age: 72
End: 2022-05-26
Attending: ORTHOPAEDIC SURGERY
Payer: MEDICARE

## 2022-05-31 ENCOUNTER — APPOINTMENT (OUTPATIENT)
Dept: PHYSICAL THERAPY | Age: 72
End: 2022-05-31
Attending: ORTHOPAEDIC SURGERY
Payer: MEDICARE

## 2022-06-01 ENCOUNTER — HOSPITAL ENCOUNTER (OUTPATIENT)
Dept: GENERAL RADIOLOGY | Age: 72
Discharge: HOME | End: 2022-06-01
Attending: ORTHOPAEDIC SURGERY
Payer: MEDICARE

## 2022-06-01 ENCOUNTER — OFFICE VISIT (OUTPATIENT)
Dept: PHYSICAL THERAPY | Age: 72
End: 2022-06-01
Attending: INTERNAL MEDICINE
Payer: MEDICARE

## 2022-06-01 ENCOUNTER — OFFICE VISIT (OUTPATIENT)
Dept: ORTHOPEDICS CLINIC | Facility: CLINIC | Age: 72
End: 2022-06-01
Payer: MEDICARE

## 2022-06-01 VITALS — OXYGEN SATURATION: 99 % | HEART RATE: 85 BPM

## 2022-06-01 DIAGNOSIS — Z96.652 STATUS POST LEFT KNEE REPLACEMENT: Primary | ICD-10-CM

## 2022-06-01 DIAGNOSIS — Z96.652 STATUS POST LEFT KNEE REPLACEMENT: ICD-10-CM

## 2022-06-01 PROCEDURE — 97140 MANUAL THERAPY 1/> REGIONS: CPT

## 2022-06-01 PROCEDURE — 97110 THERAPEUTIC EXERCISES: CPT

## 2022-06-01 PROCEDURE — 97112 NEUROMUSCULAR REEDUCATION: CPT

## 2022-06-01 PROCEDURE — 77073 BONE LENGTH STUDIES: CPT | Performed by: ORTHOPAEDIC SURGERY

## 2022-06-01 PROCEDURE — 73562 X-RAY EXAM OF KNEE 3: CPT | Performed by: ORTHOPAEDIC SURGERY

## 2022-06-01 PROCEDURE — 99024 POSTOP FOLLOW-UP VISIT: CPT | Performed by: ORTHOPAEDIC SURGERY

## 2022-06-01 RX ORDER — TRAMADOL HYDROCHLORIDE 50 MG/1
50 TABLET ORAL EVERY 8 HOURS PRN
Qty: 30 TABLET | Refills: 0 | Status: SHIPPED | OUTPATIENT
Start: 2022-06-01

## 2022-06-01 NOTE — PROGRESS NOTES
[facility] Progress Note    Subjective: Patient following up 6 weeks postop from left total knee replacement. She did have a minor setback where she had some increased pain, thinks this was due to some increased aggressiveness with therapy, but this has resolved with tapering back on the PT. She states she is doing very well, has minimal pain. She only occasionally takes tramadol as needed, would like 1 last refill. She has not had any issues with the incision. She is ambulating without assist device. She is looking forward to getting back to regular activities, and is very happy with her outcome thus far. Objective: Patient is awake alert no distress. She demonstrates left knee extension within a few degrees of full, flexion just past 110. Knee stable to varus and valgus in full extension, appropriate varus laxity in mid flexion, Lachman's/anterior drawer less than 5 mm at 90 degrees. Imaging: X-rays of the left knee personally viewed, independently interpreted and radiology report read. Demonstrates unchanged position of total knee replacement, patella tracking centrally, symmetric medial lateral joint space, tibial component within a few degrees of neutral mechanical axis. Assessment/Plan: 79-year-old female 6 weeks postop status post left total knee arthroplasty. Patient is doing very well, very happy with her outcome. She will continue with physical therapy and finish up as scheduled. 1 more tramadol refill provided for as needed use. No restrictions on the incision at this point. We did discuss kneeling, advised that if she has any discomfort to contact us and we can provide with kneeling protocol. Advised follow-up at 1 year from date of surgery with repeat x-rays. She will contact us sooner if any issues. We did discuss antibiotic prophylaxis prior to dental work.     Latanya Ocasio MD, [address] [facility]  Phone [phone]  Fax [phone]

## 2022-06-02 ENCOUNTER — APPOINTMENT (OUTPATIENT)
Dept: PHYSICAL THERAPY | Age: 72
End: 2022-06-02
Attending: ORTHOPAEDIC SURGERY
Payer: MEDICARE

## 2022-06-02 ENCOUNTER — OFFICE VISIT (OUTPATIENT)
Dept: PHYSICAL THERAPY | Age: 72
End: 2022-06-02
Attending: INTERNAL MEDICINE
Payer: MEDICARE

## 2022-06-02 PROCEDURE — 97140 MANUAL THERAPY 1/> REGIONS: CPT

## 2022-06-02 PROCEDURE — 97110 THERAPEUTIC EXERCISES: CPT

## 2022-06-02 PROCEDURE — 97112 NEUROMUSCULAR REEDUCATION: CPT

## 2022-06-07 ENCOUNTER — OFFICE VISIT (OUTPATIENT)
Dept: PHYSICAL THERAPY | Age: 72
End: 2022-06-07
Attending: ORTHOPAEDIC SURGERY
Payer: MEDICARE

## 2022-06-07 PROCEDURE — 97110 THERAPEUTIC EXERCISES: CPT

## 2022-06-07 PROCEDURE — 97112 NEUROMUSCULAR REEDUCATION: CPT

## 2022-06-09 ENCOUNTER — TELEPHONE (OUTPATIENT)
Dept: PHYSICAL THERAPY | Facility: HOSPITAL | Age: 72
End: 2022-06-09

## 2022-06-09 ENCOUNTER — APPOINTMENT (OUTPATIENT)
Dept: PHYSICAL THERAPY | Age: 72
End: 2022-06-09
Attending: ORTHOPAEDIC SURGERY
Payer: MEDICARE

## 2022-06-11 NOTE — TELEPHONE ENCOUNTER
Protocol Failed     Medication Requested:   ezetimibe-simvastatin 10-40 MG Oral Tab  Filled: 03/22/22 #90 w/ 0 refills   LOV:  03/28/22 w/ Dr. Guerrero [facility]  RTC: not on file   FOV: 06/15/22   Recent Labs:
good minus

## 2022-06-12 RX ORDER — EZETIMIBE AND SIMVASTATIN 10; 40 MG/1; MG/1
TABLET ORAL
Qty: 90 TABLET | Refills: 3 | Status: SHIPPED | OUTPATIENT
Start: 2022-06-12

## 2022-07-11 ENCOUNTER — TELEPHONE (OUTPATIENT)
Dept: INTERNAL MEDICINE CLINIC | Facility: CLINIC | Age: 72
End: 2022-07-11

## 2022-07-11 NOTE — TELEPHONE ENCOUNTER
Patient tested positive for covid this morning. Patient started with a cough on Saturday, no fever. Has taken Tylenol. Would like to speak to a nurse.

## 2022-07-15 NOTE — TELEPHONE ENCOUNTER
Spoke to both patient and her . patient states she is feeling better each day. Explained that retesting results could possibly still be positive for a few months. Patient's  is asymptomatic, two negative home covid test, explained to them that as long as he is asymptomatic, there is really no need to keep testing. If he would like he could do a PCR test.     Advised patient to stay home for today, as long as symptoms are continually improving okay to end isolation tomorrow, continue to wear mask and distance. Both verbalized understanding.

## 2022-07-15 NOTE — TELEPHONE ENCOUNTER
Pt called again and asked to speak with the nurse if her  should test and if they should do any testing outside of home.      Please advise

## 2022-08-01 ENCOUNTER — LAB ENCOUNTER (OUTPATIENT)
Dept: LAB | Age: 72
End: 2022-08-01
Attending: INTERNAL MEDICINE
Payer: MEDICARE

## 2022-08-01 ENCOUNTER — OFFICE VISIT (OUTPATIENT)
Dept: INTERNAL MEDICINE CLINIC | Facility: CLINIC | Age: 72
End: 2022-08-01
Payer: MEDICARE

## 2022-08-01 VITALS
OXYGEN SATURATION: 99 % | BODY MASS INDEX: 32.14 KG/M2 | DIASTOLIC BLOOD PRESSURE: 70 MMHG | WEIGHT: 181.38 LBS | HEART RATE: 79 BPM | RESPIRATION RATE: 16 BRPM | SYSTOLIC BLOOD PRESSURE: 132 MMHG | HEIGHT: 63 IN | TEMPERATURE: 99 F

## 2022-08-01 DIAGNOSIS — R73.01 ELEVATED FASTING GLUCOSE: ICD-10-CM

## 2022-08-01 DIAGNOSIS — F41.9 ANXIETY: ICD-10-CM

## 2022-08-01 DIAGNOSIS — J30.9 ALLERGIC RHINITIS, UNSPECIFIED SEASONALITY, UNSPECIFIED TRIGGER: ICD-10-CM

## 2022-08-01 DIAGNOSIS — K64.3 GRADE IV HEMORRHOIDS: ICD-10-CM

## 2022-08-01 DIAGNOSIS — M17.0 PRIMARY OSTEOARTHRITIS OF BOTH KNEES: ICD-10-CM

## 2022-08-01 DIAGNOSIS — Z12.31 SCREENING MAMMOGRAM FOR BREAST CANCER: ICD-10-CM

## 2022-08-01 DIAGNOSIS — U09.9 POST-COVID CHRONIC COUGH: ICD-10-CM

## 2022-08-01 DIAGNOSIS — E78.00 PURE HYPERCHOLESTEROLEMIA: ICD-10-CM

## 2022-08-01 DIAGNOSIS — M20.42 HAMMER TOE OF LEFT FOOT: ICD-10-CM

## 2022-08-01 DIAGNOSIS — Z00.00 WELLNESS EXAMINATION: Primary | ICD-10-CM

## 2022-08-01 DIAGNOSIS — R05.3 POST-COVID CHRONIC COUGH: ICD-10-CM

## 2022-08-01 DIAGNOSIS — M85.89 OSTEOPENIA OF MULTIPLE SITES: ICD-10-CM

## 2022-08-01 LAB
CHOLEST SERPL-MCNC: 159 MG/DL (ref ?–200)
EST. AVERAGE GLUCOSE BLD GHB EST-MCNC: 117 MG/DL (ref 68–126)
FASTING PATIENT LIPID ANSWER: YES
HBA1C MFR BLD: 5.7 % (ref ?–5.7)
HDLC SERPL-MCNC: 61 MG/DL (ref 40–59)
LDLC SERPL CALC-MCNC: 74 MG/DL (ref ?–100)
NONHDLC SERPL-MCNC: 98 MG/DL (ref ?–130)
TRIGL SERPL-MCNC: 138 MG/DL (ref 30–149)
VLDLC SERPL CALC-MCNC: 21 MG/DL (ref 0–30)

## 2022-08-01 PROCEDURE — 3008F BODY MASS INDEX DOCD: CPT | Performed by: INTERNAL MEDICINE

## 2022-08-01 PROCEDURE — G0439 PPPS, SUBSEQ VISIT: HCPCS | Performed by: INTERNAL MEDICINE

## 2022-08-01 PROCEDURE — 80061 LIPID PANEL: CPT

## 2022-08-01 PROCEDURE — 83036 HEMOGLOBIN GLYCOSYLATED A1C: CPT

## 2022-08-01 PROCEDURE — 96160 PT-FOCUSED HLTH RISK ASSMT: CPT | Performed by: INTERNAL MEDICINE

## 2022-08-01 PROCEDURE — 99397 PER PM REEVAL EST PAT 65+ YR: CPT | Performed by: INTERNAL MEDICINE

## 2022-08-01 PROCEDURE — 3078F DIAST BP <80 MM HG: CPT | Performed by: INTERNAL MEDICINE

## 2022-08-01 PROCEDURE — 36415 COLL VENOUS BLD VENIPUNCTURE: CPT

## 2022-08-01 PROCEDURE — 1125F AMNT PAIN NOTED PAIN PRSNT: CPT | Performed by: INTERNAL MEDICINE

## 2022-08-01 PROCEDURE — 3075F SYST BP GE 130 - 139MM HG: CPT | Performed by: INTERNAL MEDICINE

## 2022-08-01 RX ORDER — SENNOSIDES 8.6 MG/1
TABLET ORAL
COMMUNITY
Start: 2022-04-20 | End: 2022-08-01

## 2022-08-01 RX ORDER — SERTRALINE HYDROCHLORIDE 100 MG/1
TABLET, FILM COATED ORAL
COMMUNITY
Start: 2022-06-11 | End: 2022-08-01 | Stop reason: DRUGHIGH

## 2022-08-01 NOTE — PATIENT INSTRUCTIONS
Xyzal - change from claritin to see if this is more effective. Buy over the counter.      Let me know if your cough does not improve and we would plan to start Flovent inhaler daily for 4-6 weeks       Blood work    Call to schedule mammogram

## 2022-08-05 PROBLEM — R73.01 ELEVATED FASTING GLUCOSE: Status: ACTIVE | Noted: 2022-08-05

## 2022-08-05 PROBLEM — K62.5 RECTAL BLEEDING: Status: RESOLVED | Noted: 2021-05-25 | Resolved: 2022-08-05

## 2022-08-05 PROBLEM — R12 HEARTBURN: Status: RESOLVED | Noted: 2018-09-17 | Resolved: 2022-08-05

## 2022-09-07 ENCOUNTER — HOSPITAL ENCOUNTER (OUTPATIENT)
Dept: MAMMOGRAPHY | Age: 72
Discharge: HOME | End: 2022-09-07
Attending: INTERNAL MEDICINE
Payer: MEDICARE

## 2022-09-07 DIAGNOSIS — Z12.31 SCREENING MAMMOGRAM FOR BREAST CANCER: ICD-10-CM

## 2022-09-07 PROCEDURE — 77063 BREAST TOMOSYNTHESIS BI: CPT | Performed by: INTERNAL MEDICINE

## 2022-09-07 PROCEDURE — 77067 SCR MAMMO BI INCL CAD: CPT | Performed by: INTERNAL MEDICINE

## 2022-09-09 DIAGNOSIS — F41.9 ANXIETY: ICD-10-CM

## 2022-09-12 RX ORDER — SERTRALINE HYDROCHLORIDE 100 MG/1
TABLET, FILM COATED ORAL
Qty: 90 TABLET | Refills: 3 | OUTPATIENT
Start: 2022-09-12

## 2022-09-12 NOTE — TELEPHONE ENCOUNTER
LVM for patient   Is patient still taking this medication?    It was discontinued in April       No Protocol     Requesting: sertraline 100mg     LOV: 8/1/22   RTC: no follow ups on file   Filled:  Recent Labs: 8/1/22     Upcoming OV: none scheduled

## 2022-09-19 ENCOUNTER — TELEPHONE (OUTPATIENT)
Dept: INTERNAL MEDICINE CLINIC | Facility: CLINIC | Age: 72
End: 2022-09-19

## 2022-09-19 NOTE — TELEPHONE ENCOUNTER
Pt called stating she is going to be having hammertoe bunion surgery either late Oct or early Nov.    Pt states her living facility is doing the flu and covid booster. Pt would like to know if it is recommended she gets the shots before or after her surgery. Please advise.

## 2022-09-19 NOTE — TELEPHONE ENCOUNTER
Both flu and covid booster should be at least 2 weeks before surgery if possible.  It is ok to get together

## 2022-11-02 ENCOUNTER — OFFICE VISIT (OUTPATIENT)
Dept: INTERNAL MEDICINE CLINIC | Facility: CLINIC | Age: 72
End: 2022-11-02
Payer: MEDICARE

## 2022-11-02 ENCOUNTER — LAB ENCOUNTER (OUTPATIENT)
Dept: LAB | Age: 72
End: 2022-11-02
Attending: INTERNAL MEDICINE
Payer: MEDICARE

## 2022-11-02 VITALS
OXYGEN SATURATION: 100 % | HEART RATE: 70 BPM | SYSTOLIC BLOOD PRESSURE: 126 MMHG | RESPIRATION RATE: 16 BRPM | WEIGHT: 179 LBS | TEMPERATURE: 98 F | DIASTOLIC BLOOD PRESSURE: 80 MMHG | HEIGHT: 63 IN | BODY MASS INDEX: 31.71 KG/M2

## 2022-11-02 DIAGNOSIS — Z01.812 PRE-OPERATIVE LABORATORY EXAMINATION: ICD-10-CM

## 2022-11-02 DIAGNOSIS — F41.9 ANXIETY: ICD-10-CM

## 2022-11-02 DIAGNOSIS — E78.00 PURE HYPERCHOLESTEROLEMIA: ICD-10-CM

## 2022-11-02 DIAGNOSIS — Z01.810 PREOP CARDIOVASCULAR EXAM: Primary | ICD-10-CM

## 2022-11-02 DIAGNOSIS — M20.42 HAMMER TOE OF LEFT FOOT: ICD-10-CM

## 2022-11-02 LAB
ALBUMIN SERPL-MCNC: 4.2 G/DL (ref 3.4–5)
ALBUMIN/GLOB SERPL: 1.4 {RATIO} (ref 1–2)
ALP LIVER SERPL-CCNC: 74 U/L
ALT SERPL-CCNC: 30 U/L
ANION GAP SERPL CALC-SCNC: 4 MMOL/L (ref 0–18)
AST SERPL-CCNC: 24 U/L (ref 15–37)
ATRIAL RATE: 63 BPM
BASOPHILS # BLD AUTO: 0.06 X10(3) UL (ref 0–0.2)
BASOPHILS NFR BLD AUTO: 0.8 %
BILIRUB SERPL-MCNC: 0.6 MG/DL (ref 0.1–2)
BUN BLD-MCNC: 9 MG/DL (ref 7–18)
CALCIUM BLD-MCNC: 10 MG/DL (ref 8.5–10.1)
CHLORIDE SERPL-SCNC: 108 MMOL/L (ref 98–112)
CO2 SERPL-SCNC: 30 MMOL/L (ref 21–32)
CREAT BLD-MCNC: 0.72 MG/DL
EOSINOPHIL # BLD AUTO: 0.33 X10(3) UL (ref 0–0.7)
EOSINOPHIL NFR BLD AUTO: 4.4 %
ERYTHROCYTE [DISTWIDTH] IN BLOOD BY AUTOMATED COUNT: 12.8 %
FASTING STATUS PATIENT QL REPORTED: YES
GFR SERPLBLD BASED ON 1.73 SQ M-ARVRAT: 88 ML/MIN/1.73M2 (ref 60–?)
GLOBULIN PLAS-MCNC: 2.9 G/DL (ref 2.8–4.4)
GLUCOSE BLD-MCNC: 99 MG/DL (ref 70–99)
HCT VFR BLD AUTO: 45.3 %
HGB BLD-MCNC: 14.7 G/DL
IMM GRANULOCYTES # BLD AUTO: 0.03 X10(3) UL (ref 0–1)
IMM GRANULOCYTES NFR BLD: 0.4 %
LYMPHOCYTES # BLD AUTO: 2.06 X10(3) UL (ref 1–4)
LYMPHOCYTES NFR BLD AUTO: 27.5 %
MCH RBC QN AUTO: 30.4 PG (ref 26–34)
MCHC RBC AUTO-ENTMCNC: 32.5 G/DL (ref 31–37)
MCV RBC AUTO: 93.6 FL
MONOCYTES # BLD AUTO: 0.63 X10(3) UL (ref 0.1–1)
MONOCYTES NFR BLD AUTO: 8.4 %
NEUTROPHILS # BLD AUTO: 4.39 X10 (3) UL (ref 1.5–7.7)
NEUTROPHILS # BLD AUTO: 4.39 X10(3) UL (ref 1.5–7.7)
NEUTROPHILS NFR BLD AUTO: 58.5 %
OSMOLALITY SERPL CALC.SUM OF ELEC: 293 MOSM/KG (ref 275–295)
P AXIS: 67 DEGREES
P-R INTERVAL: 144 MS
PLATELET # BLD AUTO: 249 10(3)UL (ref 150–450)
POTASSIUM SERPL-SCNC: 4.1 MMOL/L (ref 3.5–5.1)
PROT SERPL-MCNC: 7.1 G/DL (ref 6.4–8.2)
Q-T INTERVAL: 438 MS
QRS DURATION: 80 MS
QTC CALCULATION (BEZET): 448 MS
R AXIS: 9 DEGREES
RBC # BLD AUTO: 4.84 X10(6)UL
SODIUM SERPL-SCNC: 142 MMOL/L (ref 136–145)
T AXIS: 25 DEGREES
VENTRICULAR RATE: 63 BPM
WBC # BLD AUTO: 7.5 X10(3) UL (ref 4–11)

## 2022-11-02 PROCEDURE — 3074F SYST BP LT 130 MM HG: CPT | Performed by: INTERNAL MEDICINE

## 2022-11-02 PROCEDURE — 3079F DIAST BP 80-89 MM HG: CPT | Performed by: INTERNAL MEDICINE

## 2022-11-02 PROCEDURE — 93000 ELECTROCARDIOGRAM COMPLETE: CPT | Performed by: INTERNAL MEDICINE

## 2022-11-02 PROCEDURE — 3008F BODY MASS INDEX DOCD: CPT | Performed by: INTERNAL MEDICINE

## 2022-11-02 PROCEDURE — 85025 COMPLETE CBC W/AUTO DIFF WBC: CPT

## 2022-11-02 PROCEDURE — 99214 OFFICE O/P EST MOD 30 MIN: CPT | Performed by: INTERNAL MEDICINE

## 2022-11-02 PROCEDURE — 36415 COLL VENOUS BLD VENIPUNCTURE: CPT

## 2022-11-02 PROCEDURE — 80053 COMPREHEN METABOLIC PANEL: CPT

## 2022-11-02 RX ORDER — MELATONIN 5 MG
TABLET,CHEWABLE ORAL
COMMUNITY
Start: 2020-10-01 | End: 2022-11-02

## 2022-11-02 RX ORDER — ACETAMINOPHEN 500 MG
500 TABLET ORAL EVERY 6 HOURS PRN
COMMUNITY
Start: 2000-10-01

## 2022-11-02 NOTE — PATIENT INSTRUCTIONS
Stop NSAIDs/aspirin 7 days prior to surgery  Stop all vitamins and supplements 14 days prior to surgery    Tylenol/acetaminophen is ok       Blood work

## 2022-11-04 ENCOUNTER — TELEPHONE (OUTPATIENT)
Dept: INTERNAL MEDICINE CLINIC | Facility: CLINIC | Age: 72
End: 2022-11-04

## 2022-11-04 NOTE — TELEPHONE ENCOUNTER
[facility]  ph: [phone]  fax: [phone]      Requested to fax H&P/Labs from preop visit.

## 2022-11-22 ENCOUNTER — TELEPHONE (OUTPATIENT)
Dept: INTERNAL MEDICINE CLINIC | Facility: CLINIC | Age: 72
End: 2022-11-22

## 2022-11-22 NOTE — TELEPHONE ENCOUNTER
Incoming fax from [address] [place] [address] Drive # [phone]  Placed in your inbasket for signature

## 2022-11-28 ENCOUNTER — TELEPHONE (OUTPATIENT)
Dept: INTERNAL MEDICINE CLINIC | Facility: CLINIC | Age: 72
End: 2022-11-28

## 2022-11-29 NOTE — TELEPHONE ENCOUNTER
Signed order faxed to [facility]   Confirmation received  Sent to scan and copy placed in accordion

## 2023-02-16 ENCOUNTER — TELEPHONE (OUTPATIENT)
Dept: INTERNAL MEDICINE CLINIC | Facility: CLINIC | Age: 73
End: 2023-02-16

## 2023-02-16 NOTE — TELEPHONE ENCOUNTER
Incoming fax from [address] [place] [address] with home health certification and plan of care as well as OT plan of care    CPYJW#[id number] and [id number]    Placed in LS in-basket

## 2023-02-17 NOTE — TELEPHONE ENCOUNTER
Signed orders faxed to [facility] health   Confirmation received   Sent to scan and copy placed in accordion

## 2023-03-24 ENCOUNTER — HOSPITAL ENCOUNTER (OUTPATIENT)
Age: 73
Discharge: HOME | End: 2023-03-24
Payer: MEDICARE

## 2023-03-24 ENCOUNTER — APPOINTMENT (OUTPATIENT)
Dept: CT IMAGING | Age: 73
End: 2023-03-24
Attending: NURSE PRACTITIONER
Payer: MEDICARE

## 2023-03-24 VITALS
OXYGEN SATURATION: 100 % | TEMPERATURE: 99 F | BODY MASS INDEX: 31.58 KG/M2 | DIASTOLIC BLOOD PRESSURE: 74 MMHG | HEART RATE: 74 BPM | WEIGHT: 185 LBS | SYSTOLIC BLOOD PRESSURE: 140 MMHG | HEIGHT: 64 IN | RESPIRATION RATE: 16 BRPM

## 2023-03-24 DIAGNOSIS — N30.90 CYSTITIS: Primary | ICD-10-CM

## 2023-03-24 LAB
POCT BILIRUBIN URINE: NEGATIVE
POCT GLUCOSE URINE: NEGATIVE MG/DL
POCT KETONE URINE: NEGATIVE MG/DL
POCT NITRITE URINE: NEGATIVE
POCT PH URINE: 6.5 (ref 5–8)
POCT PROTEIN URINE: NEGATIVE MG/DL
POCT SPECIFIC GRAVITY URINE: 1.01
POCT URINE CLARITY: CLEAR
POCT URINE COLOR: YELLOW
POCT UROBILINOGEN URINE: 0.2 MG/DL

## 2023-03-24 PROCEDURE — 87086 URINE CULTURE/COLONY COUNT: CPT | Performed by: NURSE PRACTITIONER

## 2023-03-24 PROCEDURE — 99214 OFFICE O/P EST MOD 30 MIN: CPT

## 2023-03-24 PROCEDURE — 81002 URINALYSIS NONAUTO W/O SCOPE: CPT | Performed by: NURSE PRACTITIONER

## 2023-03-24 PROCEDURE — 74176 CT ABD & PELVIS W/O CONTRAST: CPT | Performed by: NURSE PRACTITIONER

## 2023-03-24 PROCEDURE — 87186 SC STD MICRODIL/AGAR DIL: CPT | Performed by: NURSE PRACTITIONER

## 2023-03-24 PROCEDURE — 87088 URINE BACTERIA CULTURE: CPT | Performed by: NURSE PRACTITIONER

## 2023-03-24 RX ORDER — CEPHALEXIN 500 MG/1
500 CAPSULE ORAL 2 TIMES DAILY
Qty: 14 CAPSULE | Refills: 0 | Status: SHIPPED | OUTPATIENT
Start: 2023-03-24 | End: 2023-03-31

## 2023-03-24 NOTE — DISCHARGE INSTRUCTIONS
Take antibiotics as prescribed, you should finish all of this medication or your infection may return.   You may take ibuprofen or Tylenol for pain  Increase your fluid intake  Go to emergency department if you are not able to urinate, high fever, side or back pain or any other concerns

## 2023-04-21 ENCOUNTER — TELEPHONE (OUTPATIENT)
Dept: ORTHOPEDICS CLINIC | Facility: CLINIC | Age: 73
End: 2023-04-21

## 2023-04-21 DIAGNOSIS — Z96.652 STATUS POST LEFT KNEE REPLACEMENT: Primary | ICD-10-CM

## 2023-04-24 ENCOUNTER — HOSPITAL ENCOUNTER (OUTPATIENT)
Dept: GENERAL RADIOLOGY | Age: 73
Discharge: HOME | End: 2023-04-24
Attending: ORTHOPAEDIC SURGERY
Payer: MEDICARE

## 2023-04-24 ENCOUNTER — OFFICE VISIT (OUTPATIENT)
Dept: ORTHOPEDICS CLINIC | Facility: CLINIC | Age: 73
End: 2023-04-24
Payer: MEDICARE

## 2023-04-24 VITALS — OXYGEN SATURATION: 99 % | HEART RATE: 80 BPM

## 2023-04-24 DIAGNOSIS — Z96.652 STATUS POST LEFT KNEE REPLACEMENT: Primary | ICD-10-CM

## 2023-04-24 DIAGNOSIS — Z96.652 STATUS POST LEFT KNEE REPLACEMENT: ICD-10-CM

## 2023-04-24 PROCEDURE — 99213 OFFICE O/P EST LOW 20 MIN: CPT | Performed by: ORTHOPAEDIC SURGERY

## 2023-04-24 PROCEDURE — 73562 X-RAY EXAM OF KNEE 3: CPT | Performed by: ORTHOPAEDIC SURGERY

## 2023-04-24 NOTE — PROGRESS NOTES
[facility] Progress Note    Subjective: Patient returns 1 year postop from left knee replacement. She states she is doing very well, has been functioning without limitation. She did recently have surgery on bunion/hammertoe on the left and feels this has altered her gait slightly, and does note that she will occasionally get some twinges of pain around the front or outside of the knee, but it is not significant and it is well controlled with Tylenol. Of note she had a right knee replaced around 2019, states that she has not followed up on this in a while and her operating surgeon retired. She reports that she is doing very well from the right knee standpoint as well. Objective: Patient and  both present, both very pleasant and comfortable. She demonstrates full extension of the left knee, well-healed incision. Right knee also with full extension, well-healed incision. Imaging: X-rays of the left knee personally viewed, independently interpreted and radiology report read. Demonstrates cemented left total knee arthroplasty unchanged from films 1 year ago. Patella tracking centrally. No lucencies about bone cement implant interface. Assessment/Plan: 43-year-old female 1 year postop status post left knee replacement, also 4 to 5 years postop from outside right knee replacement. Patient is doing very well from both knees standpoint. Advised continued activities as tolerated. Counseled on alterations in the plantar aspect of foot contact on the ground potentially contributing to pains further up the extremity. She will continue with Tylenol as needed. Advised follow-up in 5 years, can evaluate for both knees at that time, or contact us sooner if needed.     Fabian Watson MD, [address] Orth[facility]  Phone [phone]  Fax [phone]

## 2023-06-02 ENCOUNTER — OFFICE VISIT (OUTPATIENT)
Dept: INTERNAL MEDICINE CLINIC | Facility: CLINIC | Age: 73
End: 2023-06-02
Payer: MEDICARE

## 2023-06-02 VITALS
RESPIRATION RATE: 14 BRPM | HEART RATE: 76 BPM | HEIGHT: 64 IN | TEMPERATURE: 98 F | BODY MASS INDEX: 30.39 KG/M2 | SYSTOLIC BLOOD PRESSURE: 126 MMHG | WEIGHT: 178 LBS | DIASTOLIC BLOOD PRESSURE: 64 MMHG

## 2023-06-02 DIAGNOSIS — R41.3 MEMORY LOSS: Primary | ICD-10-CM

## 2023-06-02 DIAGNOSIS — F41.9 ANXIETY: ICD-10-CM

## 2023-06-02 DIAGNOSIS — E78.00 PURE HYPERCHOLESTEROLEMIA: ICD-10-CM

## 2023-06-02 DIAGNOSIS — Z81.8 FAMILY HISTORY OF DEMENTIA: ICD-10-CM

## 2023-06-02 PROCEDURE — 99214 OFFICE O/P EST MOD 30 MIN: CPT | Performed by: INTERNAL MEDICINE

## 2023-06-02 PROCEDURE — 3074F SYST BP LT 130 MM HG: CPT | Performed by: INTERNAL MEDICINE

## 2023-06-02 PROCEDURE — 1160F RVW MEDS BY RX/DR IN RCRD: CPT | Performed by: INTERNAL MEDICINE

## 2023-06-02 PROCEDURE — 1159F MED LIST DOCD IN RCRD: CPT | Performed by: INTERNAL MEDICINE

## 2023-06-02 PROCEDURE — 3078F DIAST BP <80 MM HG: CPT | Performed by: INTERNAL MEDICINE

## 2023-06-02 PROCEDURE — 3008F BODY MASS INDEX DOCD: CPT | Performed by: INTERNAL MEDICINE

## 2023-06-02 RX ORDER — SERTRALINE HYDROCHLORIDE 100 MG/1
100 TABLET, FILM COATED ORAL DAILY
Qty: 90 TABLET | Refills: 3 | Status: SHIPPED | OUTPATIENT
Start: 2023-06-02

## 2023-06-02 RX ORDER — OMEGA-3 FATTY ACIDS/FISH OIL 300-1000MG
CAPSULE ORAL
COMMUNITY
Start: 2023-01-02

## 2023-06-02 NOTE — PATIENT INSTRUCTIONS
Increase sertraline to 75mg (1.5 tablets daily) for 3-4 days. Then increase to 100mg once a day. (You can take two of the 50mg tablets to use up what you have and then start new prescription). Blood work - fasting, prior to next appointment       Dr. Bo Bradford, Dr. Lg Schumacher, Dr. Sidney Cowan neurology.  Call to schedule appointment

## 2023-07-06 ENCOUNTER — LAB ENCOUNTER (OUTPATIENT)
Dept: LAB | Age: 73
End: 2023-07-06
Attending: INTERNAL MEDICINE
Payer: MEDICARE

## 2023-07-06 DIAGNOSIS — R41.3 MEMORY LOSS: ICD-10-CM

## 2023-07-06 DIAGNOSIS — E78.00 PURE HYPERCHOLESTEROLEMIA: ICD-10-CM

## 2023-07-06 LAB
ALBUMIN SERPL-MCNC: 4.1 G/DL (ref 3.4–5)
ALBUMIN/GLOB SERPL: 1.5 {RATIO} (ref 1–2)
ALP LIVER SERPL-CCNC: 67 U/L
ALT SERPL-CCNC: 24 U/L
ANION GAP SERPL CALC-SCNC: 4 MMOL/L (ref 0–18)
AST SERPL-CCNC: 22 U/L (ref 15–37)
BILIRUB SERPL-MCNC: 0.6 MG/DL (ref 0.1–2)
BUN BLD-MCNC: 10 MG/DL (ref 7–18)
CALCIUM BLD-MCNC: 9.3 MG/DL (ref 8.5–10.1)
CHLORIDE SERPL-SCNC: 108 MMOL/L (ref 98–112)
CHOLEST SERPL-MCNC: 203 MG/DL (ref ?–200)
CO2 SERPL-SCNC: 27 MMOL/L (ref 21–32)
CREAT BLD-MCNC: 0.64 MG/DL
FASTING PATIENT LIPID ANSWER: YES
FASTING STATUS PATIENT QL REPORTED: YES
GFR SERPLBLD BASED ON 1.73 SQ M-ARVRAT: 93 ML/MIN/1.73M2 (ref 60–?)
GLOBULIN PLAS-MCNC: 2.7 G/DL (ref 2.8–4.4)
GLUCOSE BLD-MCNC: 91 MG/DL (ref 70–99)
HDLC SERPL-MCNC: 65 MG/DL (ref 40–59)
LDLC SERPL CALC-MCNC: 113 MG/DL (ref ?–100)
NONHDLC SERPL-MCNC: 138 MG/DL (ref ?–130)
OSMOLALITY SERPL CALC.SUM OF ELEC: 287 MOSM/KG (ref 275–295)
POTASSIUM SERPL-SCNC: 4.3 MMOL/L (ref 3.5–5.1)
PROT SERPL-MCNC: 6.8 G/DL (ref 6.4–8.2)
SODIUM SERPL-SCNC: 139 MMOL/L (ref 136–145)
TRIGL SERPL-MCNC: 143 MG/DL (ref 30–149)
TSI SER-ACNC: 1.08 MIU/ML (ref 0.36–3.74)
VIT B12 SERPL-MCNC: 302 PG/ML (ref 193–986)
VLDLC SERPL CALC-MCNC: 25 MG/DL (ref 0–30)

## 2023-07-06 PROCEDURE — 80061 LIPID PANEL: CPT

## 2023-07-06 PROCEDURE — 36415 COLL VENOUS BLD VENIPUNCTURE: CPT

## 2023-07-06 PROCEDURE — 84443 ASSAY THYROID STIM HORMONE: CPT

## 2023-07-06 PROCEDURE — 82607 VITAMIN B-12: CPT

## 2023-07-06 PROCEDURE — 80053 COMPREHEN METABOLIC PANEL: CPT

## 2023-07-20 ENCOUNTER — TELEPHONE (OUTPATIENT)
Dept: NEUROLOGY | Facility: CLINIC | Age: 73
End: 2023-07-20

## 2023-07-22 ENCOUNTER — APPOINTMENT (OUTPATIENT)
Dept: CT IMAGING | Age: 73
End: 2023-07-22
Attending: EMERGENCY MEDICINE
Payer: MEDICARE

## 2023-07-22 ENCOUNTER — HOSPITAL ENCOUNTER (OUTPATIENT)
Age: 73
Discharge: HOME | End: 2023-07-22
Attending: EMERGENCY MEDICINE
Payer: MEDICARE

## 2023-07-22 ENCOUNTER — APPOINTMENT (OUTPATIENT)
Dept: GENERAL RADIOLOGY | Age: 73
End: 2023-07-22
Attending: EMERGENCY MEDICINE
Payer: MEDICARE

## 2023-07-22 VITALS
HEIGHT: 64 IN | WEIGHT: 180 LBS | OXYGEN SATURATION: 98 % | BODY MASS INDEX: 30.73 KG/M2 | TEMPERATURE: 98 F | SYSTOLIC BLOOD PRESSURE: 148 MMHG | RESPIRATION RATE: 18 BRPM | HEART RATE: 72 BPM | DIASTOLIC BLOOD PRESSURE: 80 MMHG

## 2023-07-22 DIAGNOSIS — S32.502A CLOSED FRACTURE OF LEFT PUBIS, UNSPECIFIED PORTION OF PUBIS, INITIAL ENCOUNTER (HCC): Primary | ICD-10-CM

## 2023-07-22 PROCEDURE — 76376 3D RENDER W/INTRP POSTPROCES: CPT | Performed by: EMERGENCY MEDICINE

## 2023-07-22 PROCEDURE — 99215 OFFICE O/P EST HI 40 MIN: CPT

## 2023-07-22 PROCEDURE — 99214 OFFICE O/P EST MOD 30 MIN: CPT

## 2023-07-22 PROCEDURE — 72190 X-RAY EXAM OF PELVIS: CPT | Performed by: EMERGENCY MEDICINE

## 2023-07-22 PROCEDURE — 72192 CT PELVIS W/O DYE: CPT | Performed by: EMERGENCY MEDICINE

## 2023-07-22 RX ORDER — TRAMADOL HYDROCHLORIDE 50 MG/1
TABLET ORAL EVERY 6 HOURS PRN
Qty: 30 TABLET | Refills: 0 | Status: SHIPPED | OUTPATIENT
Start: 2023-07-22

## 2023-07-22 NOTE — DISCHARGE INSTRUCTIONS
Rest but may ambulate with walker  Ice to the affected area  Tylenol for pain  Tramadol for severe pain  To ER for any new or worsening symptoms.

## 2023-07-24 ENCOUNTER — TELEPHONE (OUTPATIENT)
Dept: INTERNAL MEDICINE CLINIC | Facility: CLINIC | Age: 73
End: 2023-07-24

## 2023-07-24 NOTE — TELEPHONE ENCOUNTER
LS - bottom of summary of care document from 7/22/23 [facility] states:    Rest but may ambulate with walker  Ice to the affected area  Tylenol for pain  Tramadol for severe pain  To ER for any new or worsening symptoms. ** It also looks as if a copy was forwarded to Dr. Amanda Lockwood in orthopedics. Any additional recommendations for this pt? Please advise, ty!

## 2023-07-24 NOTE — TELEPHONE ENCOUNTER
Call orthopedics office and let them know that she is calling to schedule appointment for a new pelvis fracture [phone]

## 2023-07-24 NOTE — TELEPHONE ENCOUNTER
Pt spouse called in requesting a call back from a nurse. Pt fell Saturday and went to [facility] and pt was advised that there is a fracture of pelvic. Would like to get recommendations from Dr Eve Perez on what to do exactly. Please advise.

## 2023-07-24 NOTE — TELEPHONE ENCOUNTER
LS - condition update     [phone] spoke to [facility] at orthopedics scheduling. They have no provider that handles pelvic fractures. [phone] general surgery department, spoke to scheduling who checked with their nurses - they also said orthopedics. Consulted face-to-face with Dr. Carlos Alberto Blount before she left for the day. Suggested [facility] Phone: [phone] spoke to Ezekiel Dickson who checked and confirmed that yes, they can treat a pelvic fracture. [phone] LMTCB for spouse Krishan Bonner, also left name & phone number for [facility] as listed above. [phone] spoke to both pt and  Krishan Bonner over speaker phone. Pt called ortho this morning and was told the same - no provider available. Gave pt 3 names/addresses/phone numbers. They will call tomorrow and let us know if they can't get in anywhere within 1-2 weeks. B[facility] [place], and [facility]). Pt states she is taking 1 tramadol 50 mg, about 4 hours later, 1 G of Tylenol, and then 1 tramadol again. Pt is doing okay so far. Pt was advised this will heal on it's own because it is nondisplaced. Recommended pt get specialist opinion to prevent accidentally making something worse. Pt and  v/u and agreed with this plan.

## 2023-08-07 RX ORDER — EZETIMIBE AND SIMVASTATIN 10; 40 MG/1; MG/1
1 TABLET ORAL NIGHTLY
Qty: 90 TABLET | Refills: 3 | Status: SHIPPED | OUTPATIENT
Start: 2023-08-07

## 2023-09-15 ENCOUNTER — TELEPHONE (OUTPATIENT)
Dept: INTERNAL MEDICINE CLINIC | Facility: CLINIC | Age: 73
End: 2023-09-15

## 2023-09-15 DIAGNOSIS — Z12.31 SCREENING MAMMOGRAM FOR BREAST CANCER: Primary | ICD-10-CM

## 2023-09-18 ENCOUNTER — HOSPITAL ENCOUNTER (OUTPATIENT)
Dept: MAMMOGRAPHY | Age: 73
Discharge: HOME | End: 2023-09-18
Attending: INTERNAL MEDICINE
Payer: MEDICARE

## 2023-09-18 DIAGNOSIS — Z12.31 SCREENING MAMMOGRAM FOR BREAST CANCER: ICD-10-CM

## 2023-09-18 PROCEDURE — 77067 SCR MAMMO BI INCL CAD: CPT | Performed by: INTERNAL MEDICINE

## 2023-09-18 PROCEDURE — 77063 BREAST TOMOSYNTHESIS BI: CPT | Performed by: INTERNAL MEDICINE

## 2023-10-30 ENCOUNTER — OFFICE VISIT (OUTPATIENT)
Dept: NEUROLOGY | Facility: CLINIC | Age: 73
End: 2023-10-30

## 2023-10-30 VITALS
SYSTOLIC BLOOD PRESSURE: 132 MMHG | DIASTOLIC BLOOD PRESSURE: 62 MMHG | BODY MASS INDEX: 30 KG/M2 | RESPIRATION RATE: 16 BRPM | WEIGHT: 175 LBS | HEART RATE: 72 BPM

## 2023-10-30 DIAGNOSIS — G31.84 MILD NEUROCOGNITIVE DISORDER: ICD-10-CM

## 2023-10-30 DIAGNOSIS — R41.3 MEMORY LOSS: ICD-10-CM

## 2023-10-30 RX ORDER — DONEPEZIL HYDROCHLORIDE 5 MG/1
5 TABLET, FILM COATED ORAL NIGHTLY
Qty: 30 TABLET | Refills: 5 | Status: SHIPPED | OUTPATIENT
Start: 2023-10-30

## 2023-10-30 RX ORDER — LORAZEPAM 1 MG/1
1 TABLET ORAL ONCE
Qty: 1 TABLET | Refills: 0 | Status: SHIPPED | OUTPATIENT
Start: 2023-10-30 | End: 2023-10-30

## 2023-10-30 NOTE — PROGRESS NOTES
Pt states here for memory loss. Pt states having a family hx of dementia. Pt states getting lost when driving.  Pt was taking Alprazolam and was told to stop taking but feels like that helped

## 2023-10-31 NOTE — PROGRESS NOTES
HPI:    Patient ID: Nova Middleton is a 75year old female. PCP: Dr Arash Ruiz    Thank you for referring this patient to us. Below is the summary of my evaluation    HPI  Mac Murillo is a 75year old female who presents for evaluation for memory loss. She reports for the last couple years has been having some noticeable memory problem for example getting lost while driving, had difficulty following cake baking instructions. She does not have issues with daily activities and still manages home bills. She was taking Alprazolam and was told to stop taking to see if that improves her symptom. She states there is significant family history, her mother had memory problem and her older brother was diagnosed with Dementia.      HISTORY:  Past Medical History:   Diagnosis Date    Anemia     had hysterectomy    Anxiety     Arthritis     Back pain     Blood in the stool     occasionally    Constipation     Diverticulosis of large intestine     Easy bruising always    Excessive bleeding 2018    hemorrhoids    Feeling lonely 2020    Frequent urination just recently    Frequent use of laxatives for years    occasionally    Headache disorder 1992    sinus    Hearing impairment     No HA's    Hearing loss     Heartburn 2015    occasionally    Hemorrhoids     High cholesterol     History of blood transfusion Years ago     Auto    Indigestion     occasionally    Irregular bowel habits for years    stress, traveling    Leaking of urine recently    at night    Osteoarthritis     Pain in joints 2010    PONV (postoperative nausea and vomiting)     Rectal bleeding 2021    Sleep disturbance 2016    occasionally    Stress     Visual impairment     glasses/ contacts    Weight gain       Past Surgical History:   Procedure Laterality Date    APPENDECTOMY            CARPAL TUNNEL RELEASE  2016    bilateral     CHOLECYSTECTOMY  2007    COLONOSCOPY      COLONOSCOPY N/A 02/10/2021    Procedure: COLONOSCOPY;  Surgeon: Domingo Williamson MD;  Location: [address] [place] [address] ENDOSCOPY    CORRECT BUNION,OTHR METHODS Right     HEMORRHOIDECTOMY  2021    HEMORRHOIDECTOMY,INT/EXT,SIMPLE  2019    HYSTERECTOMY      age 35 complete hysterectomy     OOPHORECTOMY      TONSILLECTOMY      TOTAL KNEE REPLACEMENT Right     right and left    UPPER GI ENDOSCOPY,EXAM  2019      Family History   Problem Relation Age of Onset    Cancer Mother 80        colon cancer     Colon Cancer Mother         contained    Colon Polyps Mother     Cancer Paternal Grandmother 39        breast    Breast Cancer Paternal Grandmother     Heart Disorder Paternal Grandfather     Hypertension Paternal Grandfather         unknown    Heart Attack Paternal Grandfather          at 71    Diabetes Maternal Grandmother         unknown    Stroke Maternal Grandfather         several strokes    Mental Disorder Brother         42's      Social History     Socioeconomic History    Marital status:    Tobacco Use    Smoking status: Former     Packs/day: 1.00     Years: 15.00     Additional pack years: 0.00     Total pack years: 15.00     Types: Cigarettes     Quit date: 1982     Years since quittin.8    Smokeless tobacco: Never   Vaping Use    Vaping Use: Never used   Substance and Sexual Activity    Alcohol use: Not Currently     Alcohol/week: 0.0 - 1.0 standard drinks of alcohol     Comment: occasionally    Drug use: Never   Other Topics Concern    Caffeine Concern Yes     Comment: 3-4 cups of coffee daily. Exercise No        Review of Systems   Constitutional: Negative. HENT: Negative. Eyes: Negative. Respiratory: Negative. Cardiovascular: Negative. Gastrointestinal: Negative. Endocrine: Negative. Genitourinary: Negative. Musculoskeletal: Negative. Skin: Negative. Allergic/Immunologic: Negative. Neurological: Negative.         + memory loss   Hematological: Negative. Psychiatric/Behavioral: Negative.      All other systems reviewed and are negative. Current Outpatient Medications   Medication Sig Dispense Refill    donepezil 5 MG Oral Tab Take 1 tablet (5 mg total) by mouth nightly. 30 tablet 5    LORazepam (ATIVAN) 1 MG Oral Tab Take 1 tablet (1 mg total) by mouth one time for 1 dose. For MRI test 1 tablet 0    ezetimibe-simvastatin 10-40 MG Oral Tab Take 1 tablet by mouth nightly. 90 tablet 3    Ibuprofen (ADVIL) 200 MG Oral Cap       sertraline 100 MG Oral Tab Take 1 tablet (100 mg total) by mouth daily. 90 tablet 3    acetaminophen 500 MG Oral Tab Take 1 tablet (500 mg total) by mouth every 6 (six) hours as needed. Multiple Vitamins-Minerals (CENTRUM SILVER 50+WOMEN) Oral Tab Take 1 tablet by mouth daily. Wheat Dextrin (BENEFIBER) Oral Powder Take by mouth nightly as needed. Melatonin 5 MG Oral Chew Tab Chew 5 mg by mouth nightly as needed. Probiotic Product (ALIGN OR) Take 1 tablet by mouth daily. Allergies:  Erythromycin            NAUSEA AND VOMITING  Seasonal                OTHER (SEE COMMENTS)    Comment:Runny nose, sneezing  PHYSICAL EXAM:   Physical Exam    General Appearance: Well nourished, well developed, no apparent distress. HEENT: Normocephalic and atraumatic. Normal sclera. Moist mucus membrane  Neck: Normal range of motion. Neck supple. Cardiovascular: Normal rate, regular rhythm and normal heart sounds. Pulmonary/Chest: Effort normal and breath sounds normal.   Abdominal: Soft. Bowel sounds are normal.   Musculoskeletal: + Phalen's signs at both wrist    Mental Status Exam: Patient is awake, alert and oriented to person, place and time Normal attention span.  Speech is fluent with intact comprehension    MOCA-  23/30  Visuospatial/executive function- 3/5  Naming- 3/3  Attention- 3/3  Calculations 1/3  Language 3/3  Abstraction 2/2  Delayed recall 2/5  Orientation 6/6    Cranial Nerves:   II: Visual fields: normal  III: Pupils: equal, round, reactive to light  III,IV,VI: Extra Ocular Movements: intact  V: Facial sensation: intact  VII: Facial strength: intact  VIII: Hearing: intact  IX: Palate: intact  XI: Shoulder shrug: intact  XII: Tongue movement: normal    Motor: Normal tone. Strength is  5 out of 5 in all extremities bilaterally. DTR: present    Sensory: Sensory examination is normal to light touch and pinprick     Coordination: Finger-to-nose normal bilaterally without evidence of dysmetria. Gait: Casual, toe, heel, and tandem gait are normal.       TESTS/IMAGING:     none    ASSESSMENT/PLAN:     (G31.84) Mild neurocognitive disorder      (R41.3) Memory loss  Plan: MRI BRAIN (CPT=70551), Psychology Referral - In        Network               Patient is a 75year old female who presents for evaluation for memory loss  Cognitive screening shows impaired visuospatial skills and executive function    Plan: Neuropsychology evaluation to assess for Alzheimer's dementia    Obtain MRI brain to rule out any silent infarcts and vascular dementia. Start Donepezil 5 mg at night    Counseled on cognitive exercises and driving restrictions    Thank you for allowing us to participate in your patient's care. I will continue to follow with you and will make further recommendations based on her progress. Total 45 minutes spent with patient >50% of visit was spent in counseling and coordination of care      Ebony Villavicencio MD  [address] [place] [address] This Visit:  Requested Prescriptions     Signed Prescriptions Disp Refills    donepezil 5 MG Oral Tab 30 tablet 5     Sig: Take 1 tablet (5 mg total) by mouth nightly. LORazepam (ATIVAN) 1 MG Oral Tab 1 tablet 0     Sig: Take 1 tablet (1 mg total) by mouth one time for 1 dose.  For MRI test       Imaging & Referrals:  PSYCHOLOGY - INTERNAL  MRI BRAIN (CPT=70551)     BM#[id number]

## 2023-11-28 NOTE — TELEPHONE ENCOUNTER
Requested Prescriptions     Pending Prescriptions Disp Refills    sertraline 100 MG Oral Tab 90 tablet 3     Sig: Take 1 tablet (100 mg total) by mouth daily.      No protocol   NEW PHARMACY     LOV 6/2/23  RTC 6 weeks  Filled 6/2/23 90 tabs 3 refills   Future Appointments   Date Time Provider [place]   12/12/2023  8:15 AM WDR MRI RM1 (1.5T WIDE) WDR MRI EDW [facility]   12/22/2023 10:00 AM Luiz Sykes MD [facility] [place]   2/9/2024 10:30 AM Mo Grant MD ENINAPER [facility]

## 2023-11-29 RX ORDER — SERTRALINE HYDROCHLORIDE 100 MG/1
100 TABLET, FILM COATED ORAL DAILY
Qty: 90 TABLET | Refills: 3 | Status: SHIPPED | OUTPATIENT
Start: 2023-11-29

## 2023-12-12 ENCOUNTER — HOSPITAL ENCOUNTER (OUTPATIENT)
Age: 73
Discharge: HOME | End: 2023-12-12
Attending: EMERGENCY MEDICINE
Payer: MEDICARE

## 2023-12-12 VITALS
DIASTOLIC BLOOD PRESSURE: 91 MMHG | WEIGHT: 175 LBS | HEART RATE: 68 BPM | TEMPERATURE: 98 F | OXYGEN SATURATION: 100 % | HEIGHT: 64 IN | SYSTOLIC BLOOD PRESSURE: 150 MMHG | BODY MASS INDEX: 29.88 KG/M2 | RESPIRATION RATE: 16 BRPM

## 2023-12-12 DIAGNOSIS — J01.00 ACUTE MAXILLARY SINUSITIS, RECURRENCE NOT SPECIFIED: Primary | ICD-10-CM

## 2023-12-12 PROCEDURE — 99213 OFFICE O/P EST LOW 20 MIN: CPT

## 2023-12-12 RX ORDER — AMOXICILLIN 500 MG/1
500 TABLET, FILM COATED ORAL 3 TIMES DAILY
Qty: 21 TABLET | Refills: 0 | Status: SHIPPED | OUTPATIENT
Start: 2023-12-12 | End: 2023-12-19

## 2023-12-12 NOTE — DISCHARGE INSTRUCTIONS
Follow up with your primary care doctor  Take amoxil three times a day for days  Take over the counter decongestant  Take tylenol or motrin for any pain/fever

## 2023-12-20 RX ORDER — AMOXICILLIN 500 MG/1
500 TABLET, FILM COATED ORAL 3 TIMES DAILY
Qty: 21 TABLET | Refills: 0 | OUTPATIENT
Start: 2023-12-20

## 2023-12-20 NOTE — TELEPHONE ENCOUNTER
Yes requesting antibiotics.  Patient went to ER and was diagnosed with sinus infection.  Took last dose of antibiotic last night.   She feels she needs more as symptoms are still present.     Patient states she is prone to sinus infections and it usually take 2-3 rounds to clear infection.

## 2023-12-20 NOTE — TELEPHONE ENCOUNTER
Will discuss further on Friday but the antibiotic is still in her system for 48 hrs after last dose. May need to change to something else if symptoms have not improved but will re-eval at appt on 12/22

## 2023-12-22 ENCOUNTER — OFFICE VISIT (OUTPATIENT)
Dept: INTERNAL MEDICINE CLINIC | Facility: CLINIC | Age: 73
End: 2023-12-22
Payer: MEDICARE

## 2023-12-22 VITALS
RESPIRATION RATE: 16 BRPM | HEIGHT: 64 IN | OXYGEN SATURATION: 99 % | WEIGHT: 176 LBS | SYSTOLIC BLOOD PRESSURE: 150 MMHG | DIASTOLIC BLOOD PRESSURE: 74 MMHG | BODY MASS INDEX: 30.05 KG/M2 | HEART RATE: 84 BPM | TEMPERATURE: 97 F

## 2023-12-22 DIAGNOSIS — R09.82 POSTNASAL DRIP: ICD-10-CM

## 2023-12-22 DIAGNOSIS — E78.00 PURE HYPERCHOLESTEROLEMIA: ICD-10-CM

## 2023-12-22 DIAGNOSIS — R41.3 MEMORY LOSS: ICD-10-CM

## 2023-12-22 DIAGNOSIS — Z00.00 WELLNESS EXAMINATION: Primary | ICD-10-CM

## 2023-12-22 DIAGNOSIS — R53.83 FATIGUE, UNSPECIFIED TYPE: ICD-10-CM

## 2023-12-22 DIAGNOSIS — R73.01 ELEVATED FASTING GLUCOSE: ICD-10-CM

## 2023-12-22 DIAGNOSIS — R03.0 ELEVATED BLOOD PRESSURE READING: ICD-10-CM

## 2023-12-22 PROBLEM — J41.0 SMOKERS' COUGH (HCC): Chronic | Status: ACTIVE | Noted: 2023-12-22

## 2023-12-22 PROCEDURE — 1159F MED LIST DOCD IN RCRD: CPT | Performed by: INTERNAL MEDICINE

## 2023-12-22 PROCEDURE — 1160F RVW MEDS BY RX/DR IN RCRD: CPT | Performed by: INTERNAL MEDICINE

## 2023-12-22 PROCEDURE — 1170F FXNL STATUS ASSESSED: CPT | Performed by: INTERNAL MEDICINE

## 2023-12-22 PROCEDURE — 3078F DIAST BP <80 MM HG: CPT | Performed by: INTERNAL MEDICINE

## 2023-12-22 PROCEDURE — 3077F SYST BP >= 140 MM HG: CPT | Performed by: INTERNAL MEDICINE

## 2023-12-22 PROCEDURE — 96160 PT-FOCUSED HLTH RISK ASSMT: CPT | Performed by: INTERNAL MEDICINE

## 2023-12-22 PROCEDURE — 1126F AMNT PAIN NOTED NONE PRSNT: CPT | Performed by: INTERNAL MEDICINE

## 2023-12-22 PROCEDURE — 3008F BODY MASS INDEX DOCD: CPT | Performed by: INTERNAL MEDICINE

## 2023-12-22 PROCEDURE — G0439 PPPS, SUBSEQ VISIT: HCPCS | Performed by: INTERNAL MEDICINE

## 2023-12-22 RX ORDER — KETOROLAC TROMETHAMINE 4 MG/ML
SOLUTION/ DROPS OPHTHALMIC
COMMUNITY
Start: 2023-12-14

## 2023-12-22 RX ORDER — LORAZEPAM 1 MG/1
1 TABLET ORAL ONCE
COMMUNITY
Start: 2023-10-30

## 2023-12-22 RX ORDER — BESIFLOXACIN 6 MG/ML
SUSPENSION OPHTHALMIC
COMMUNITY
Start: 2023-12-14

## 2023-12-22 RX ORDER — MOXIFLOXACIN 5 MG/ML
1 SOLUTION/ DROPS OPHTHALMIC 3 TIMES DAILY
COMMUNITY
Start: 2023-12-21

## 2023-12-22 RX ORDER — FLUTICASONE PROPIONATE 50 MCG
2 SPRAY, SUSPENSION (ML) NASAL DAILY
Qty: 9.9 ML | Refills: 1 | Status: SHIPPED | OUTPATIENT
Start: 2023-12-22 | End: 2024-12-16

## 2023-12-22 RX ORDER — DIFLUPREDNATE OPHTHALMIC 0.5 MG/ML
EMULSION OPHTHALMIC
COMMUNITY
Start: 2023-12-14

## 2023-12-22 RX ORDER — MELATONIN
1000 DAILY
COMMUNITY
Start: 2023-12-22

## 2023-12-22 NOTE — PATIENT INSTRUCTIONS
Blood work     Dr. Syed Hu - neurology appointment    MRI brain - call to schedule at [phone]    Start fluticasone nasal steroid - take 2 sprays each nostril once daily  Do not take the day before eye surgery or day of surgery    Check blood pressure at home and send me readings in 2 weeks  Goal is under 140/90.

## 2023-12-26 NOTE — TELEPHONE ENCOUNTER
Requesting 90 day supply     Protocol passed     Fluticasone propionate 50mcg/act     LOV: 12/22/23   RTC: none noted   Filled: 12/22/23 #9.9mL 1 refill

## 2023-12-27 RX ORDER — FLUTICASONE PROPIONATE 50 MCG
2 SPRAY, SUSPENSION (ML) NASAL DAILY
Qty: 48 G | Refills: 0 | OUTPATIENT
Start: 2023-12-27

## 2024-01-11 ENCOUNTER — LAB ENCOUNTER (OUTPATIENT)
Dept: LAB | Age: 74
End: 2024-01-11
Attending: INTERNAL MEDICINE
Payer: MEDICARE

## 2024-01-11 DIAGNOSIS — R53.83 FATIGUE, UNSPECIFIED TYPE: ICD-10-CM

## 2024-01-11 DIAGNOSIS — R41.3 MEMORY LOSS: ICD-10-CM

## 2024-01-11 DIAGNOSIS — R73.01 ELEVATED FASTING GLUCOSE: ICD-10-CM

## 2024-01-11 DIAGNOSIS — E78.00 PURE HYPERCHOLESTEROLEMIA: ICD-10-CM

## 2024-01-11 LAB
ALBUMIN SERPL-MCNC: 4.1 G/DL (ref 3.4–5)
ALBUMIN/GLOB SERPL: 1.4 {RATIO} (ref 1–2)
ALP LIVER SERPL-CCNC: 75 U/L
ALT SERPL-CCNC: 29 U/L
ANION GAP SERPL CALC-SCNC: 1 MMOL/L (ref 0–18)
AST SERPL-CCNC: 17 U/L (ref 15–37)
BASOPHILS # BLD AUTO: 0.06 X10(3) UL (ref 0–0.2)
BASOPHILS NFR BLD AUTO: 0.9 %
BILIRUB SERPL-MCNC: 0.6 MG/DL (ref 0.1–2)
BUN BLD-MCNC: 9 MG/DL (ref 9–23)
CALCIUM BLD-MCNC: 9.2 MG/DL (ref 8.5–10.1)
CHLORIDE SERPL-SCNC: 108 MMOL/L (ref 98–112)
CHOLEST SERPL-MCNC: 200 MG/DL (ref ?–200)
CO2 SERPL-SCNC: 31 MMOL/L (ref 21–32)
CREAT BLD-MCNC: 0.85 MG/DL
EGFRCR SERPLBLD CKD-EPI 2021: 72 ML/MIN/1.73M2 (ref 60–?)
EOSINOPHIL # BLD AUTO: 0.36 X10(3) UL (ref 0–0.7)
EOSINOPHIL NFR BLD AUTO: 5.2 %
ERYTHROCYTE [DISTWIDTH] IN BLOOD BY AUTOMATED COUNT: 13 %
EST. AVERAGE GLUCOSE BLD GHB EST-MCNC: 105 MG/DL (ref 68–126)
FASTING PATIENT LIPID ANSWER: YES
FASTING STATUS PATIENT QL REPORTED: YES
GLOBULIN PLAS-MCNC: 2.9 G/DL (ref 2.8–4.4)
GLUCOSE BLD-MCNC: 99 MG/DL (ref 70–99)
HBA1C MFR BLD: 5.3 % (ref ?–5.7)
HCT VFR BLD AUTO: 44.9 %
HDLC SERPL-MCNC: 74 MG/DL (ref 40–59)
HGB BLD-MCNC: 14.3 G/DL
IMM GRANULOCYTES # BLD AUTO: 0.03 X10(3) UL (ref 0–1)
IMM GRANULOCYTES NFR BLD: 0.4 %
LDLC SERPL CALC-MCNC: 101 MG/DL (ref ?–100)
LYMPHOCYTES # BLD AUTO: 2.31 X10(3) UL (ref 1–4)
LYMPHOCYTES NFR BLD AUTO: 33.3 %
MCH RBC QN AUTO: 30.2 PG (ref 26–34)
MCHC RBC AUTO-ENTMCNC: 31.8 G/DL (ref 31–37)
MCV RBC AUTO: 94.7 FL
MONOCYTES # BLD AUTO: 0.54 X10(3) UL (ref 0.1–1)
MONOCYTES NFR BLD AUTO: 7.8 %
NEUTROPHILS # BLD AUTO: 3.64 X10 (3) UL (ref 1.5–7.7)
NEUTROPHILS # BLD AUTO: 3.64 X10(3) UL (ref 1.5–7.7)
NEUTROPHILS NFR BLD AUTO: 52.4 %
NONHDLC SERPL-MCNC: 126 MG/DL (ref ?–130)
OSMOLALITY SERPL CALC.SUM OF ELEC: 289 MOSM/KG (ref 275–295)
PLATELET # BLD AUTO: 234 10(3)UL (ref 150–450)
POTASSIUM SERPL-SCNC: 4.1 MMOL/L (ref 3.5–5.1)
PROT SERPL-MCNC: 7 G/DL (ref 6.4–8.2)
RBC # BLD AUTO: 4.74 X10(6)UL
SODIUM SERPL-SCNC: 140 MMOL/L (ref 136–145)
TRIGL SERPL-MCNC: 148 MG/DL (ref 30–149)
VIT B12 SERPL-MCNC: 893 PG/ML (ref 193–986)
VLDLC SERPL CALC-MCNC: 25 MG/DL (ref 0–30)
WBC # BLD AUTO: 6.9 X10(3) UL (ref 4–11)

## 2024-01-11 PROCEDURE — 82607 VITAMIN B-12: CPT

## 2024-01-11 PROCEDURE — 36415 COLL VENOUS BLD VENIPUNCTURE: CPT

## 2024-01-11 PROCEDURE — 85025 COMPLETE CBC W/AUTO DIFF WBC: CPT

## 2024-01-11 PROCEDURE — 80053 COMPREHEN METABOLIC PANEL: CPT

## 2024-01-11 PROCEDURE — 80061 LIPID PANEL: CPT

## 2024-01-11 PROCEDURE — 83036 HEMOGLOBIN GLYCOSYLATED A1C: CPT

## 2024-01-21 ENCOUNTER — HOSPITAL ENCOUNTER (OUTPATIENT)
Dept: MRI IMAGING | Facility: HOSPITAL | Age: 74
Discharge: HOME | End: 2024-01-21
Attending: Other
Payer: MEDICARE

## 2024-01-21 ENCOUNTER — HOSPITAL ENCOUNTER (OUTPATIENT)
Dept: MRI IMAGING | Facility: HOSPITAL | Age: 74
End: 2024-01-21
Attending: Other
Payer: MEDICARE

## 2024-01-21 DIAGNOSIS — R41.3 MEMORY LOSS: ICD-10-CM

## 2024-01-21 PROCEDURE — 70551 MRI BRAIN STEM W/O DYE: CPT | Performed by: OTHER

## 2024-01-22 ENCOUNTER — PATIENT MESSAGE (OUTPATIENT)
Dept: INTERNAL MEDICINE CLINIC | Facility: CLINIC | Age: 74
End: 2024-01-22

## 2024-01-22 NOTE — TELEPHONE ENCOUNTER
From: Jamia Duran  To: Maggie Peacock  Sent: 1/22/2024 11:00 AM CST  Subject: Jamia BP Readings    Dr Peacock,     My BP readings are attached.    Sorry for the delay.    Jamia Duran

## 2024-03-06 NOTE — PROGRESS NOTES
[facility] Progress Note    Subjective: pain well controlled. Tolerating diet and voiding. Ambulated with therapy, had some knee buckling. Objective: awake alert NAD, sitting up in bed  LLE NVI, dressing CDI      Assessment/Plan: 66year old female postop day #1 status post L TKA.   Doing well, exceeding expectations  -Weightbearing as tolerated, PT OT  -DVT prophylaxis mechanical plus aspirin twice daily  -Pain control with oral medications  -Perioperative Ancef  Disposition: floor, HI home after clears therapy    Jarrell Brooks MD, [address] Orthopedic Surgery  Phone [phone]  Fax [phone] Pt arrived from Endo to PACU bay 2. Report received from Endo staff. Pt arousable to voice. Pt on RA, NSR, and VSS. Will continue to monitor.

## 2024-04-08 RX ORDER — DONEPEZIL HYDROCHLORIDE 5 MG/1
5 TABLET, FILM COATED ORAL NIGHTLY
Qty: 30 TABLET | Refills: 5 | OUTPATIENT
Start: 2024-04-08

## 2024-04-08 NOTE — TELEPHONE ENCOUNTER
Medication: donepezil     Date of last refill: 10/30/2023 (#30/5)  [place]     Last office visit: 10/30/2023  Due back to clinic per last office note:  3 months  Date next office visit scheduled:    No future appointments.        Last OV note recommendation:    Patient is a 73 year old female who presents for evaluation for memory loss  Cognitive screening shows impaired visuospatial skills and executive function     Plan: Neuropsychology evaluation to assess for Alzheimer's dementia    Obtain MRI brain to rule out any silent infarcts and vascular dementia.     Start Donepezil 5 mg at night     Counseled on cognitive exercises and driving restrictions

## 2024-08-23 ENCOUNTER — TELEPHONE (OUTPATIENT)
Dept: INTERNAL MEDICINE CLINIC | Facility: CLINIC | Age: 74
End: 2024-08-23

## 2024-08-23 DIAGNOSIS — Z12.31 SCREENING MAMMOGRAM FOR BREAST CANCER: ICD-10-CM

## 2024-08-30 RX ORDER — EZETIMIBE AND SIMVASTATIN 10; 40 MG/1; MG/1
1 TABLET ORAL NIGHTLY
Qty: 90 TABLET | Refills: 3 | Status: SHIPPED | OUTPATIENT
Start: 2024-08-30

## 2024-08-30 NOTE — TELEPHONE ENCOUNTER
LVM for patient   Due for MAS     LOV: 12/22/23   RTC: none noted   Filled: 8/7/23 #90 3 refills   Labs: 1/11/24   No future appointments.

## 2024-09-19 ENCOUNTER — HOSPITAL ENCOUNTER (OUTPATIENT)
Dept: MAMMOGRAPHY | Age: 74
Discharge: HOME | End: 2024-09-19
Attending: INTERNAL MEDICINE
Payer: MEDICARE

## 2024-09-19 DIAGNOSIS — Z12.31 SCREENING MAMMOGRAM FOR BREAST CANCER: ICD-10-CM

## 2024-09-19 PROCEDURE — 77067 SCR MAMMO BI INCL CAD: CPT | Performed by: INTERNAL MEDICINE

## 2024-09-19 PROCEDURE — 77063 BREAST TOMOSYNTHESIS BI: CPT | Performed by: INTERNAL MEDICINE

## 2024-10-18 RX ORDER — SERTRALINE HYDROCHLORIDE 100 MG/1
100 TABLET, FILM COATED ORAL DAILY
Qty: 90 TABLET | Refills: 3 | Status: CANCELLED | OUTPATIENT
Start: 2024-10-18

## 2024-10-19 ENCOUNTER — OFFICE VISIT (OUTPATIENT)
Dept: INTERNAL MEDICINE CLINIC | Facility: CLINIC | Age: 74
End: 2024-10-19
Payer: MEDICARE

## 2024-10-19 VITALS
HEART RATE: 94 BPM | RESPIRATION RATE: 16 BRPM | HEIGHT: 63 IN | OXYGEN SATURATION: 97 % | DIASTOLIC BLOOD PRESSURE: 80 MMHG | WEIGHT: 170.38 LBS | TEMPERATURE: 98 F | SYSTOLIC BLOOD PRESSURE: 130 MMHG | BODY MASS INDEX: 30.19 KG/M2

## 2024-10-19 DIAGNOSIS — G31.84 MILD NEUROCOGNITIVE DISORDER: ICD-10-CM

## 2024-10-19 DIAGNOSIS — R73.01 ELEVATED FASTING GLUCOSE: ICD-10-CM

## 2024-10-19 DIAGNOSIS — Z00.00 WELLNESS EXAMINATION: Primary | ICD-10-CM

## 2024-10-19 DIAGNOSIS — R41.3 MEMORY LOSS: ICD-10-CM

## 2024-10-19 DIAGNOSIS — M20.42 HAMMER TOE OF LEFT FOOT: ICD-10-CM

## 2024-10-19 DIAGNOSIS — R09.82 POSTNASAL DRIP: ICD-10-CM

## 2024-10-19 DIAGNOSIS — M17.0 PRIMARY OSTEOARTHRITIS OF BOTH KNEES: ICD-10-CM

## 2024-10-19 DIAGNOSIS — E78.00 PURE HYPERCHOLESTEROLEMIA: ICD-10-CM

## 2024-10-19 DIAGNOSIS — Z23 NEED FOR INFLUENZA VACCINATION: ICD-10-CM

## 2024-10-19 DIAGNOSIS — K64.3 GRADE IV HEMORRHOIDS: ICD-10-CM

## 2024-10-19 DIAGNOSIS — M85.89 OSTEOPENIA OF MULTIPLE SITES: ICD-10-CM

## 2024-10-19 DIAGNOSIS — F41.9 ANXIETY: ICD-10-CM

## 2024-10-19 PROBLEM — J41.0 SMOKERS' COUGH (HCC): Chronic | Status: ACTIVE | Noted: 2024-10-19

## 2024-10-19 RX ORDER — SERTRALINE HYDROCHLORIDE 100 MG/1
100 TABLET, FILM COATED ORAL DAILY
Qty: 90 TABLET | Refills: 3 | Status: SHIPPED | OUTPATIENT
Start: 2024-10-19

## 2024-10-19 RX ORDER — DONEPEZIL HYDROCHLORIDE 5 MG/1
5 TABLET, FILM COATED ORAL NIGHTLY
Qty: 90 TABLET | Refills: 1 | Status: SHIPPED | OUTPATIENT
Start: 2024-10-19

## 2024-10-19 NOTE — PATIENT INSTRUCTIONS
Tdap - (tetanus and whopping cough) - can get at pharmacy    Call to schedule appointment with neurology such as with Dr. Mackay [phone]    Plan to see psychiatry for medication management and Ramses Humphrey/Leigha Marie or colleague will reach out to you sometime in the next week     Blood work

## 2024-10-19 NOTE — PROGRESS NOTES
Subjective:   Jamia Duran is a 73 year old female who presents for a MA AHA (Medicare Advantage Annual Health Assessment) and Subsequent Annual Wellness visit (Pt already had Initial Annual Wellness) and scheduled follow up of multiple significant but stable problems.     Short term memory loss   She did not want to take donepezil 5mg but notes if I think she should take it, she would take a refill. She does not think her memory was improved but is unsure. She denies side effects    Anxiety is worse in situations such as going to the grocery store by herself as she gets concerned that she will lose the car although that has not happened   She notes she has had time periods where anxiety is much worse  On sertraline 100mg     Breast pain has resolved; she relates to how a bra was fitting  Last mammo was normal    High cholesterol on statin    History/Other:   Fall Risk Assessment:   She has been screened for Falls and is low risk.      Cognitive Assessment:   She had a completely normal cognitive assessment - see flowsheet entries     Functional Ability/Status:   Jamia Duran has some abnormal functions as listed below:  She has problems with Daily Activities based on screening of functional status.       Depression Screening (PHQ):  PHQ-2 SCORE: 0  , done 10/19/2024       Advanced Directives:   She does NOT have a Living Will. [Do you have a living will?: No]  She does have a POA but we do NOT have it on file in Epic.    Not discussed      Patient Active Problem List   Diagnosis    Anxiety    Primary osteoarthritis of both knees    Hammer toe of left foot    Pure hypercholesterolemia    Osteopenia of multiple sites    Grade IV hemorrhoids    Elevated fasting glucose    Smokers' cough (HCC)     Allergies:  She is allergic to erythromycin and seasonal.    Current Medications:  Outpatient Medications Marked as Taking for the 10/19/24 encounter (Office Visit) with Maggie Peacock MD   Medication Sig    donepezil 5  MG Oral Tab Take 1 tablet (5 mg total) by mouth nightly.    sertraline 100 MG Oral Tab Take 1 tablet (100 mg total) by mouth daily.       Medical History:  She  has a past medical history of Anemia (), Anxiety, Arthritis, Back pain (), Blood in the stool (), Constipation, Diverticulosis of large intestine, Easy bruising (always), Excessive bleeding (), Feeling lonely (), Frequent urination (just recently), Frequent use of laxatives (for years), Headache disorder (), Hearing impairment, Hearing loss, Heartburn (), Hemorrhoids, High cholesterol, History of blood transfusion (Years ago ), Indigestion, Irregular bowel habits (for years), Leaking of urine (recently), Osteoarthritis, Pain in joints (), PONV (postoperative nausea and vomiting), Rectal bleeding (2021), Sleep disturbance (), Stress, Visual impairment, and Weight gain ().  Surgical History:  She  has a past surgical history that includes carpal tunnel release (); cholecystectomy (); appendectomy; ; total knee replacement (Right); hemorrhoidectomy,int/ext,simple (); tonsillectomy (); correct bunion,othr methods (Right); colonoscopy; upper gi endoscopy,exam (); hysterectomy (); colonoscopy (N/A, 02/10/2021); hemorrhoidectomy (2021); oophorectomy; and needle biopsy left.   Family History:  Her family history includes Breast Cancer in her paternal grandmother; Cancer (age of onset: 45) in her paternal grandmother; Cancer (age of onset: 85) in her mother; Colon Cancer in her mother; Colon Polyps in her mother; Diabetes in her maternal grandmother; Heart Attack in her paternal grandfather; Heart Disorder in her paternal grandfather; Hypertension in her paternal grandfather; Mental Disorder in her brother; Stroke in her maternal grandfather.  Social History:  She  reports that she quit smoking about 42 years ago. Her smoking use included cigarettes. She started smoking about 57 years  ago. She has a 15 pack-year smoking history. She has never used smokeless tobacco. She reports current alcohol use. She reports that she does not use drugs.    Tobacco:  She smoked tobacco in the past but quit greater than 12 months ago.  Social History     Tobacco Use   Smoking Status Former    Current packs/day: 0.00    Average packs/day: 1 pack/day for 15.0 years (15.0 ttl pk-yrs)    Types: Cigarettes    Start date: 1967    Quit date: 1982    Years since quittin.8   Smokeless Tobacco Never        CAGE Alcohol Screen:   CAGE screening score of 0 on 10/19/2024, showing low risk of alcohol abuse.      Patient Care Team:  Maggie Peacock MD as PCP - General (Internal Medicine)  Craolyn Deleon, PT as Physical Therapist (Physical Therapy)  Faviola Bowen MD (GASTROENTEROLOGY)  Nitin Jeffers PA as Physician Assistant (NEUROSURGERY)  Douglas Cheng PT as Physical Therapist (Physical Therapy)  Keo Grant MD as Consulting Physician (NEUROLOGY)    Review of Systems   Constitutional:  Negative for fever.   HENT:  Negative for congestion.    Eyes:  Negative for visual disturbance.   Respiratory:  Negative for shortness of breath.    Cardiovascular:  Negative for chest pain.   Gastrointestinal:  Negative for constipation.   Genitourinary:  Negative for dysuria.   Neurological: Negative.    Hematological: Negative.    Psychiatric/Behavioral: Negative.           Objective:   Physical Exam  Vitals reviewed.   Constitutional:       General: She is not in acute distress.     Appearance: She is well-developed.   HENT:      Head: Normocephalic and atraumatic.      Right Ear: Tympanic membrane normal.      Left Ear: Tympanic membrane normal.   Eyes:      Conjunctiva/sclera: Conjunctivae normal.   Cardiovascular:      Rate and Rhythm: Normal rate and regular rhythm.      Heart sounds: Normal heart sounds.   Pulmonary:      Effort: Pulmonary effort is normal.      Breath sounds: Normal breath sounds.    Musculoskeletal:      Cervical back: Neck supple.      Right lower leg: No edema.      Left lower leg: No edema.   Skin:     General: Skin is warm and dry.   Neurological:      General: No focal deficit present.      Mental Status: She is alert.   Psychiatric:         Mood and Affect: Mood normal.           /80 (BP Location: Left arm, Patient Position: Sitting, Cuff Size: adult)   Pulse 94   Temp 98 °F (36.7 °C) (Temporal)   Resp 16   Ht 5' 3\" (1.6 m)   Wt 170 lb 6.4 oz (77.3 kg)   SpO2 97%   BMI 30.19 kg/m²  Estimated body mass index is 30.19 kg/m² as calculated from the following:    Height as of this encounter: 5' 3\" (1.6 m).    Weight as of this encounter: 170 lb 6.4 oz (77.3 kg).    Medicare Hearing Assessment:   Hearing Screening    Time taken: 10/19/2024 10:04 AM  Screening Method: Finger Rub  Finger Rub Result: Pass         Visual Acuity:   Right Eye Visual Acuity: Corrected Right Eye Chart Acuity: 20/25   Left Eye Visual Acuity: Corrected Left Eye Chart Acuity: 20/25   Both Eyes Visual Acuity: Corrected Both Eyes Chart Acuity: 20/30   Able To Tolerate Visual Acuity: Yes        Assessment & Plan:   Jamia Duran is a 73 year old female who presents for a Medicare Assessment.     1. Wellness examination (Primary)  -flu shot today  -Tdap recommended  -mammo completed in 9/2024  -colonoscopy completed in 2021   2. Pure hypercholesterolemia -chronic, stable. Check labs   -     Lipid Panel  -     Comp Metabolic Panel (14)  3. Memory loss and   -     Behavioral Health Consultation  -     TSH W Reflex To Free T4; Future; Expected date: 10/19/2024  4. Mild neurocognitive disorder  - chronic, stable. Mild short term memory loss symptoms. Previously prescribed donepezil and denied side effects. She is not sure if it helped. Discussed trial of re-starting and follow up with neurology. She plans to see a different specialist than previous.  5. Elevated fasting glucose - stable. Normal A1c on last  check  6. Anxiety -chronic, worsened. Recommend med clinic/geriatric psychiatry follow up for medication management in setting of anxiety and memory loss. She notes worsened anxiety when she is going grocery shopping for example as she is concerned that she won't be able to find the car although she notes she has not lost the car in the past.    -     Behavioral Health Consultation   -     Sertraline HCl; Take 1 tablet (100 mg total) by mouth daily.  Dispense: 90 tablet; Refill: 3  8. Postnasal drip -chronic, stable. Cont fluticasone prn  9. Grade IV hemorrhoids -stable   10. Primary osteoarthritis of both knees -chronic, stable   11. Hammer toe of left foot -stable   12. Osteopenia of multiple sites -stable   13. Need for influenza vaccination  -     High Dose Fluzone trivalent influenza, 65 yrs+ PFS [97775]  Other orders  -     Donepezil HCl; Take 1 tablet (5 mg total) by mouth nightly.  Dispense: 90 tablet; Refill: 1    The patient indicates understanding of these issues and agrees to the plan.  Reinforced healthy diet, lifestyle, and exercise.      No follow-ups on file.     Maggie Peacock MD, 10/19/2024     Supplementary Documentation:   General Health:  In the past six months, have you lost more than 10 pounds without trying?: 2 - No  Has your appetite been poor?: No  Type of Diet: Balanced  How does the patient maintain a good energy level?: Other;Daily Walks;Stretching  How would you describe your daily physical activity?: Light  How would you describe your current health state?: Fair  How do you maintain positive mental well-being?: Puzzles;Games;Visiting Family  On a scale of 0 to 10, with 0 being no pain and 10 being severe pain, what is your pain level?: 0 - (None)  In the past six months, have you experienced urine leakage?: 0-No  At any time do you feel concerned for the safety/well-being of yourself and/or your children, in your home or elsewhere?: No  Have you had any immunizations at another office  such as Influenza, Hepatitis B, Tetanus, or Pneumococcal?: No    Health Maintenance   Topic Date Due    MA Annual Health Assessment  01/01/2024    Annual Depression Screening  01/01/2024    Fall Risk Screening (Annual)  01/01/2024    COVID-19 Vaccine (6 - 2023-24 season) 09/01/2024    Influenza Vaccine (1) 10/01/2024    Mammogram  09/19/2025    Colorectal Cancer Screening  02/10/2031    DEXA Scan  Completed    Zoster Vaccines  Completed    Pneumococcal Vaccine: 65+ Years  Discontinued

## 2024-11-08 ENCOUNTER — LAB ENCOUNTER (OUTPATIENT)
Dept: LAB | Age: 74
End: 2024-11-08
Attending: INTERNAL MEDICINE
Payer: MEDICARE

## 2024-11-08 DIAGNOSIS — R41.3 MEMORY LOSS: ICD-10-CM

## 2024-11-08 LAB
ALBUMIN SERPL-MCNC: 4.8 G/DL (ref 3.2–4.8)
ALBUMIN/GLOB SERPL: 2.5 {RATIO} (ref 1–2)
ALP LIVER SERPL-CCNC: 69 U/L
ALT SERPL-CCNC: 16 U/L
ANION GAP SERPL CALC-SCNC: 4 MMOL/L (ref 0–18)
AST SERPL-CCNC: 22 U/L (ref ?–34)
BILIRUB SERPL-MCNC: 0.6 MG/DL (ref 0.2–1.1)
BUN BLD-MCNC: 7 MG/DL (ref 9–23)
CALCIUM BLD-MCNC: 9.8 MG/DL (ref 8.7–10.4)
CHLORIDE SERPL-SCNC: 107 MMOL/L (ref 98–112)
CHOLEST SERPL-MCNC: 185 MG/DL (ref ?–200)
CO2 SERPL-SCNC: 32 MMOL/L (ref 21–32)
CREAT BLD-MCNC: 0.77 MG/DL
EGFRCR SERPLBLD CKD-EPI 2021: 80 ML/MIN/1.73M2 (ref 60–?)
FASTING PATIENT LIPID ANSWER: YES
FASTING STATUS PATIENT QL REPORTED: YES
GLOBULIN PLAS-MCNC: 1.9 G/DL (ref 2–3.5)
GLUCOSE BLD-MCNC: 94 MG/DL (ref 70–99)
HDLC SERPL-MCNC: 68 MG/DL (ref 40–59)
LDLC SERPL CALC-MCNC: 98 MG/DL (ref ?–100)
NONHDLC SERPL-MCNC: 117 MG/DL (ref ?–130)
OSMOLALITY SERPL CALC.SUM OF ELEC: 294 MOSM/KG (ref 275–295)
POTASSIUM SERPL-SCNC: 4.2 MMOL/L (ref 3.5–5.1)
PROT SERPL-MCNC: 6.7 G/DL (ref 5.7–8.2)
SODIUM SERPL-SCNC: 143 MMOL/L (ref 136–145)
TRIGL SERPL-MCNC: 107 MG/DL (ref 30–149)
TSI SER-ACNC: 1.28 UIU/ML (ref 0.55–4.78)
VLDLC SERPL CALC-MCNC: 18 MG/DL (ref 0–30)

## 2024-11-08 PROCEDURE — 84443 ASSAY THYROID STIM HORMONE: CPT

## 2024-11-08 PROCEDURE — 80053 COMPREHEN METABOLIC PANEL: CPT | Performed by: INTERNAL MEDICINE

## 2024-11-08 PROCEDURE — 36415 COLL VENOUS BLD VENIPUNCTURE: CPT | Performed by: INTERNAL MEDICINE

## 2024-11-08 PROCEDURE — 80061 LIPID PANEL: CPT | Performed by: INTERNAL MEDICINE

## 2025-01-05 PROBLEM — F41.0 PANIC ATTACK: Status: ACTIVE | Noted: 2025-01-05

## 2025-01-05 PROBLEM — F41.1 GENERALIZED ANXIETY DISORDER: Status: ACTIVE | Noted: 2025-01-05

## 2025-05-12 ENCOUNTER — TELEPHONE (OUTPATIENT)
Age: 75
End: 2025-05-12

## 2025-05-12 NOTE — TELEPHONE ENCOUNTER
The [facility] Navigation team has attempted to reach you in order to follow up on an order that was placed by Dr. Mackay's office. Please give us a call back at [phone] to discuss care coordination and resources.

## 2025-05-16 ENCOUNTER — TELEPHONE (OUTPATIENT)
Age: 75
End: 2025-05-16

## 2025-05-16 NOTE — TELEPHONE ENCOUNTER
Hello - I am reaching out from the [facility] Navigation department, following up on an order from your provider's office to assist in connecting you with resources for care. If you would like to discuss this further, please give us a call at [phone], or for more immediate assistance you can contact our 24-hour help line at [phone]. We look forward to hearing from you soon.

## 2025-05-16 NOTE — TELEPHONE ENCOUNTER
[facility]  [address], [address], [place], [address]  Phone: [phone]  [email or web address]     [facility]  [address], [place], [address]  Phone: [phone]  [email or web address] ([email or web address]    [facility]  [address], [place], [address]  Phone: [phone]  [email or web address]     [facility]  [address], [address], [place], [place], [address]  Phone: [phone]  [email or web address]

## 2025-06-16 ENCOUNTER — TELEPHONE (OUTPATIENT)
Age: 75
End: 2025-06-16

## 2025-08-08 RX ORDER — EZETIMIBE AND SIMVASTATIN 10; 40 MG/1; MG/1
1 TABLET ORAL NIGHTLY
Qty: 90 TABLET | Refills: 3 | Status: SHIPPED | OUTPATIENT
Start: 2025-08-08

## (undated) DIAGNOSIS — M17.12 ARTHRITIS OF LEFT KNEE: Primary | ICD-10-CM

## (undated) DIAGNOSIS — R79.1 PROLONGED PTT: Primary | ICD-10-CM

## (undated) DIAGNOSIS — J32.9 SINUSITIS, UNSPECIFIED CHRONICITY, UNSPECIFIED LOCATION: Primary | ICD-10-CM

## (undated) DEVICE — LIGHT HANDLE

## (undated) DEVICE — SCREWS PACK: Brand: KNEE INSTRUMENTS

## (undated) DEVICE — SOL  .9 1000ML BTL

## (undated) DEVICE — #15 STERILE STAINLESS BLADE: Brand: STERILE STAINLESS BLADES

## (undated) DEVICE — TRAP 4 CPTR CHMBR N EZ INLN

## (undated) DEVICE — Device: Brand: PLUMEPEN

## (undated) DEVICE — GYN CDS: Brand: MEDLINE INDUSTRIES, INC.

## (undated) DEVICE — STERILE POLYISOPRENE POWDER-FREE SURGICAL GLOVES: Brand: PROTEXIS

## (undated) DEVICE — SNARE 9MM 230CM 2.4MM EXACTO

## (undated) DEVICE — 3M™ IOBAN™ 2 ANTIMICROBIAL INCISE DRAPE 6651EZ: Brand: IOBAN™ 2

## (undated) DEVICE — 1010 S-DRAPE TOWEL DRAPE 10/BX: Brand: STERI-DRAPE™

## (undated) DEVICE — CAUTERY PENCIL

## (undated) DEVICE — SUTURE ETHIBOND 5 V-37

## (undated) DEVICE — GMK SPHERE KNEE: Type: IMPLANTABLE DEVICE

## (undated) DEVICE — HOOD, PEEL-AWAY: Brand: FLYTE

## (undated) DEVICE — SUTURE ETHILON 2-0 FS

## (undated) DEVICE — PREMIUM WET SKIN PREP TRAY: Brand: MEDLINE INDUSTRIES, INC.

## (undated) DEVICE — SCD SLEEVE KNEE HI BLEND

## (undated) DEVICE — BOWL CEMENT MIX QUICK-VAC

## (undated) DEVICE — GOWN SURG AERO CHROME XXL

## (undated) DEVICE — TIP CLEANER: Brand: VALLEYLAB

## (undated) DEVICE — ALCOHOL 70% 4 OZ

## (undated) DEVICE — NEEDLE SPINAL 18X3-1/2 PINK.

## (undated) DEVICE — 3M™ RED DOT™ MONITORING ELECTRODE WITH FOAM TAPE AND STICKY GEL, 50/BAG, 20/CASE, 72/PLT 2570: Brand: RED DOT™

## (undated) DEVICE — 1200CC GUARDIAN II: Brand: GUARDIAN

## (undated) DEVICE — Device

## (undated) DEVICE — SCRUB PVP -1 PREP SOLUTION 4OZ

## (undated) DEVICE — SURGICAL SCRB HIBICLENS 4OZ

## (undated) DEVICE — SMOOTH PINS PACK: Brand: KNEE INSTRUMENTS

## (undated) DEVICE — THREADED PINS PACK: Brand: KNEE INSTRUMENTS

## (undated) DEVICE — SOL  .9 3000ML

## (undated) DEVICE — SPONGE STICK WITH PVP-I: Brand: KENDALL

## (undated) DEVICE — Device: Brand: STABLECUT®

## (undated) DEVICE — ENDOSCOPY PACK - LOWER: Brand: MEDLINE INDUSTRIES, INC.

## (undated) DEVICE — SPECIMEN CONTAINER,POSITIVE SEAL INDICATOR, OR PACKAGED: Brand: PRECISION

## (undated) DEVICE — VIOLET BRAIDED (POLYGLACTIN 910), SYNTHETIC ABSORBABLE SUTURE: Brand: COATED VICRYL

## (undated) DEVICE — HOOD: Brand: FLYTE

## (undated) DEVICE — 33MM PROXIMATE PPH, PROCEDURE FOR PROLAPSE AND HEMORRHOIDS SET: Brand: PROXIMATE

## (undated) DEVICE — DISPOSABLE TOURNIQUET CUFF SINGLE BLADDER, DUAL PORT AND QUICK CONNECT CONNECTOR: Brand: COLOR CUFF

## (undated) DEVICE — DRAPE,U/SHT,SPLIT,FILM,60X84,STERILE: Brand: MEDLINE

## (undated) DEVICE — PADDING CAST COTTON  4

## (undated) DEVICE — TOTAL HIP CDS: Brand: MEDLINE INDUSTRIES, INC.

## (undated) DEVICE — WRAP COOLING KNEE W/ICE PILLOW

## (undated) DEVICE — Device: Brand: DEFENDO AIR/WATER/SUCTION AND BIOPSY VALVE

## (undated) DEVICE — TOWEL SURG OR 17X30IN BLUE

## (undated) DEVICE — SWORD PIN PACK: Brand: KNEE INSTRUMENTS

## (undated) DEVICE — STERILE POLYISOPRENE POWDER-FREE SURGICAL GLOVES WITH EMOLLIENT COATING: Brand: PROTEXIS

## (undated) DEVICE — SUTURE VICRYL 2-0 CP-1

## (undated) DEVICE — SYRINGE 30ML LL TIP

## (undated) DEVICE — FILTERLINE NASAL ADULT O2/CO2

## (undated) DEVICE — SHORT THREADED PINS PACK: Brand: KNEE INSTRUMENTS

## (undated) DEVICE — LAPAROTOMY SPONGE - RF AND X-RAY DETECTABLE PRE-WASHED: Brand: SITUATE

## (undated) DEVICE — DRESS WOUND AQUACEL 3.5INX12IN

## (undated) DEVICE — SUTURE PROLENE 1 CT-1

## (undated) NOTE — LETTER
Iris Mckeon [address]  [address], [address] [facility]  Authorization for Surgical Operation and Procedure     Date:___________                                                                                                         Time:__________ 4.   Should the need arise during my operation or immediate post-operative period, I also consent to the administration of blood and/or blood products.   Further, I understand that despite careful testing and screening of blood or blood products by yuliet 8.   I recognize that in the event my procedure results in extended X-Ray/fluoroscopy time, I may develop a skin reaction. 9.  If I have a Do Not Attempt Resuscitation (DNAR) order in place, that status will be suspended while in the operating room, proc 1. IGloria agree to be cared for by an anesthesiologist, who is specially trained to monitor me and give me medicine to put me to sleep or keep me comfortable during my procedure    I understand that my anesthesiologist is not an employee or age 5. My doctor has explained to me other choices available to me for my care (alternatives).   6. Pregnant Patients (“epidural”):  I understand that the risks of having an epidural (medicine given into my back to help control pain during labor), include itchi

## (undated) NOTE — LETTER
22  [facility]  Pre-Operative Clearance Request    Patient Name:   Nii Coronado             :   10/21/1950    Surgeon: Dr. Homero Shane             Date of Surgery: NEW DATE 22    Surgical Procedure: LEFT TOTAL KNEE ARTHROPLASTY. Please Complete: 2-3 WEEKS PRIOR TO SURGERY TO AVOID CANCELLATION.      [x]  History and Physical    [x]  Medical Clearance    []  Cardiac Clearance                 []  Other:       Testing:      []  Chest X-Ray                                                               [x]  PT/INR    [x]  CBC W/Diff                                                                [x]  PTT     [x]  CMP                                                                            []  Sed Rate and CRP                  []  EKG 12 Lead                                                              [x]  Type and Screen                     []  Hemoglobin A1C                                                      []  Urinalysis w/ culture and reflex       [x]  MRSA/MSSA Culture at [facility]/PAT                   []  Other:                                  **Please fax test results, H&P, and clearance notes to [phone]**

## (undated) NOTE — LETTER
Patient Name: Katie Jo  YOB: 1950          MRN number:  [id number]  Date:  8/1/2019  Referring Physician:  Dr. Krishna Tsang    Dx:  s/p R TKA 7/16/19          Next MD visit: 8/6/19             ProgressSummary  Pt has attended 3 v

## (undated) NOTE — LETTER
03/05/20        Jeffery Victoria  [address] [facility] [id number]      Dear Fidelia Fagan,    [address] [facility] records indicate that you have outstanding lab work and or testing that was ordered for you and has not yet been completed: Fasting lab work - Make

## (undated) NOTE — LETTER
Patient Name: Jean Mueller  YOB: 1950          MRN number:  [id number]  Date:  8/27/2019  Referring Physician:  Efraín Cooper    Progress/Discharge Summary  Pt has attended 10 visits in Physical Therapy.    Dx:  s/p R TKA 7/16/19           In · Pt will be independent and compliant with comprehensive HEP to maintain progress achieved in PT-progress      Plan: Continue skilled Physical Therapy 2 x/week or a total of 2 additional visits over a 90 day period.  Then D/C with continued compliance to H

## (undated) NOTE — LETTER
07/23/18        Manish Peoples  [address]      Dear Alfred Rodriguez,    [address] records indicate that you have outstanding lab work and or testing that was ordered for you and has not yet been completed:          Lipid Panel [E]      Com

## (undated) NOTE — LETTER
06/28/21        [address] [place] [id number]      Dear Kaylee Mejia,    [address] [facility] records indicate that you have outstanding lab work and or testing that was ordered for you and has not yet been completed:  Orders Placed This E

## (undated) NOTE — LETTER
OUTSIDE TESTING RESULT REQUEST     IMPORTANT: FOR YOUR IMMEDIATE ATTENTION  Please FAX all test results listed below to: [phone]     Testing already done on or about: 21     * * * * If testing is NOT complete, arrange with patient A.S.A.P. * * * *      Patient Name: Buzz Ontiveros  Surgery Date: 2022  CSN: [id number]  Medical Record: [id number]   : 10/21/1950 - A: 66 y      Sex: female  Surgeon(s):  Jazmin Horton MD  Procedure: LEFT TOTAL KNEE ARTHROPLASTY   Anesthesia Type: Choice     Surgeon: Jazmin Horton MD     The following Testing and Time Line are REQUIRED PER ANESTHESIA     CBC [with Differential & Platelets] within  90 days  CMP (requires 4 hour fast) within  90 days  PT/INR within  30 days  PTT within  30 days  Type and Screen for Pre-Admission Testing (must be within 28 days of surgery)  MSSA/MRSA Nasal screening within 30 days      Thank You,     Sent by:Naz Her RN      5/13/15

## (undated) NOTE — Clinical Note
I saw Nadya Buckner in the [facility] in [facility] today for UTI symptoms,  she was treated with Macrobid. Nadya Buckner will follow up with you if no better or as needed.  Thank you for the opportunity to care for FUNMILAYO Andrew

## (undated) NOTE — LETTER
21    Patient: Roseline Charles  : 10/21/1950 Visit date: 2021    Dear  Jackie Pena MD    Thank you for referring Roseline Charles to my practice. Please find my assessment and plan below.        Assessment   Grade IV hemorrhoids  (chary

## (undated) NOTE — Clinical Note
Dear Dr. Clive Norris,       Thank you for referring Jose Middleton to the [facility].   Sincerely,  EDDA Grajeda

## (undated) NOTE — ED AVS SNAPSHOT
Umang    MRN: [id number]    Department:  [facility] Emergency Department in [place]   Date of Visit:  12/6/2017           Disclosure     Insurance plans vary and the physician(s) referred by the ER may not be covered by your plan.  Please contact tell this physician (or your personal doctor if your instructions are to return to your personal doctor) about any new or lasting problems. The primary care or specialist physician will see patients referred from the [facility] Emergency Department.  Olman Berry

## (undated) NOTE — LETTER
Patient Name: Fannie Gann  YOB: 1950          MRN number:  [id number]  Date:  8/22/2019  Referring Physician:  Mortimer Felty    ProgressSummary  Pt has attended 10 visits in Physical Therapy.    Dx:  s/p R TKA 7/16/19           Insurance (Au · Pt will improve SLS to >5s to improve safety and independence with gait on uneven surfaces such as grass -MET  · Pt will be independent and compliant with comprehensive HEP to maintain progress achieved in PT    Plan: update HEP next visit  FOTO: ***

## (undated) NOTE — LETTER
Patient Name: Diana Escamilla  YOB: 1950          MRN number:  [id number]  Date:  7/30/2019  Referring Physician:  Brett Wright      POST-OP KNEE EVALUATION:    Referring Physician: FUNMILAYO López  Diagnosis: s/p R TKA 7/16/19     Date of diagnosis of post-op TKA. Pt and PT discussed evaluation findings, pathology, POC and HEP. Pt voiced understanding and performs HEP correctly without reported pain.  Skilled Physical Therapy is medically necessary to address the above impairments and reach · Pt will improve postural knee extension for proper heel strike during gait and terminal knee extension in stance  · Pt will improve knee AROM flexion to >110 degrees to improve ability to perform stairs with 1 UE assist  · Pt will demonstrate increased k

## (undated) NOTE — LETTER
21  [address] [facility]  Pre-Operative Clearance Request    Patient Name:   Evelyn Nayak             :   10/21/1950    Surgeon: Dr. Simran Isabel                       Date of Surgery: 22    Surgical Procedure: LEFT TOTAL KNEE ARTHROPLASTY       Please Complete:    [x]  History and Physical    [x]  Medical Clearance    []  Cardiac Clearance                             []  Other:       Testing:      []  Chest X-Ray                                                               [x]  PT/INR    [x]  CBC W/Diff                                                                [x]  PTT     [x]  CMP                                                                            []  Sed Rate and CRP                  []  EKG 12 Lead                                                              [x]  Type and Screen                     []  Hemoglobin A1C                                                      []  Urinalysis w/ culture and reflex       [x]  MRSA/MSSA Culture at [facility]/PAT                   []  Other:                                  **Please fax test results, H&P, and clearance notes to [phone]**

## (undated) NOTE — LETTER
Patient Name: Leonor Israel CSN: [id number]  -Age / Sex: 10/21/1950-A: 66 y  female Medical Records: [id number]    ABNORMAL VALUES  Surgeon(s):  Claudia Lowe MD  Anesthesia Type: Choice  Procedure Description: LEFT TOTAL KNEE ARTHROPLASTY   Primary Surgeon:  Claudia Lowe MD  Phone Number: [phone]    PLEASE NOTE THE FOLLOWING ABNORMALITIES:   PTT=37.4 on   ________________________________________________________  Will be repeated STAT on admit